# Patient Record
Sex: FEMALE | Race: WHITE | Employment: OTHER | URBAN - METROPOLITAN AREA
[De-identification: names, ages, dates, MRNs, and addresses within clinical notes are randomized per-mention and may not be internally consistent; named-entity substitution may affect disease eponyms.]

---

## 2017-06-22 ENCOUNTER — OFFICE VISIT (OUTPATIENT)
Dept: INTERNAL MEDICINE CLINIC | Age: 67
End: 2017-06-22

## 2017-06-22 VITALS
HEART RATE: 78 BPM | RESPIRATION RATE: 16 BRPM | WEIGHT: 165 LBS | BODY MASS INDEX: 29.23 KG/M2 | SYSTOLIC BLOOD PRESSURE: 124 MMHG | DIASTOLIC BLOOD PRESSURE: 82 MMHG | HEIGHT: 63 IN

## 2017-06-22 DIAGNOSIS — Z00.00 MEDICARE ANNUAL WELLNESS VISIT, SUBSEQUENT: Primary | ICD-10-CM

## 2017-06-22 DIAGNOSIS — E78.1 HYPERTRIGLYCERIDEMIA: ICD-10-CM

## 2017-06-22 DIAGNOSIS — B18.1 CHRONIC VIRAL HEPATITIS B WITHOUT DELTA AGENT AND WITHOUT COMA (HCC): ICD-10-CM

## 2017-06-22 DIAGNOSIS — B19.10 HEPATITIS B INFECTION WITHOUT DELTA AGENT WITHOUT HEPATIC COMA, UNSPECIFIED CHRONICITY: ICD-10-CM

## 2017-06-22 DIAGNOSIS — E78.2 MIXED HYPERLIPIDEMIA: ICD-10-CM

## 2017-06-22 PROCEDURE — 3017F COLORECTAL CA SCREEN DOC REV: CPT | Performed by: INTERNAL MEDICINE

## 2017-06-22 PROCEDURE — 1090F PRES/ABSN URINE INCON ASSESS: CPT | Performed by: INTERNAL MEDICINE

## 2017-06-22 PROCEDURE — G8419 CALC BMI OUT NRM PARAM NOF/U: HCPCS | Performed by: INTERNAL MEDICINE

## 2017-06-22 PROCEDURE — G0439 PPPS, SUBSEQ VISIT: HCPCS | Performed by: INTERNAL MEDICINE

## 2017-06-22 PROCEDURE — 4040F PNEUMOC VAC/ADMIN/RCVD: CPT | Performed by: INTERNAL MEDICINE

## 2017-06-22 PROCEDURE — 3014F SCREEN MAMMO DOC REV: CPT | Performed by: INTERNAL MEDICINE

## 2017-06-22 PROCEDURE — 99203 OFFICE O/P NEW LOW 30 MIN: CPT | Performed by: INTERNAL MEDICINE

## 2017-06-22 PROCEDURE — 1123F ACP DISCUSS/DSCN MKR DOCD: CPT | Performed by: INTERNAL MEDICINE

## 2017-06-22 PROCEDURE — G8399 PT W/DXA RESULTS DOCUMENT: HCPCS | Performed by: INTERNAL MEDICINE

## 2017-06-22 PROCEDURE — G8427 DOCREV CUR MEDS BY ELIG CLIN: HCPCS | Performed by: INTERNAL MEDICINE

## 2017-06-22 PROCEDURE — 4004F PT TOBACCO SCREEN RCVD TLK: CPT | Performed by: INTERNAL MEDICINE

## 2017-06-22 RX ORDER — ATORVASTATIN CALCIUM 20 MG/1
20 TABLET, FILM COATED ORAL DAILY
Qty: 30 TABLET | Refills: 0 | Status: SHIPPED | OUTPATIENT
Start: 2017-06-22 | End: 2018-06-28 | Stop reason: SDUPTHER

## 2017-06-22 ASSESSMENT — LIFESTYLE VARIABLES
HOW OFTEN DURING THE LAST YEAR HAVE YOU FOUND THAT YOU WERE NOT ABLE TO STOP DRINKING ONCE YOU HAD STARTED: 0
HAVE YOU OR SOMEONE ELSE BEEN INJURED AS A RESULT OF YOUR DRINKING: 0
HOW MANY STANDARD DRINKS CONTAINING ALCOHOL DO YOU HAVE ON A TYPICAL DAY: 0
HOW OFTEN DO YOU HAVE SIX OR MORE DRINKS ON ONE OCCASION: 0
HOW OFTEN DURING THE LAST YEAR HAVE YOU HAD A FEELING OF GUILT OR REMORSE AFTER DRINKING: 0
HOW OFTEN DURING THE LAST YEAR HAVE YOU FAILED TO DO WHAT WAS NORMALLY EXPECTED FROM YOU BECAUSE OF DRINKING: 0
HOW OFTEN DO YOU HAVE A DRINK CONTAINING ALCOHOL: 2
AUDIT-C TOTAL SCORE: 2
HAS A RELATIVE, FRIEND, DOCTOR, OR ANOTHER HEALTH PROFESSIONAL EXPRESSED CONCERN ABOUT YOUR DRINKING OR SUGGESTED YOU CUT DOWN: 0
HOW OFTEN DURING THE LAST YEAR HAVE YOU NEEDED AN ALCOHOLIC DRINK FIRST THING IN THE MORNING TO GET YOURSELF GOING AFTER A NIGHT OF HEAVY DRINKING: 0
AUDIT TOTAL SCORE: 2
HOW OFTEN DURING THE LAST YEAR HAVE YOU BEEN UNABLE TO REMEMBER WHAT HAPPENED THE NIGHT BEFORE BECAUSE YOU HAD BEEN DRINKING: 0

## 2017-06-22 ASSESSMENT — ANXIETY QUESTIONNAIRES: GAD7 TOTAL SCORE: 0

## 2017-06-22 ASSESSMENT — PATIENT HEALTH QUESTIONNAIRE - PHQ9: SUM OF ALL RESPONSES TO PHQ QUESTIONS 1-9: 0

## 2018-06-28 ENCOUNTER — OFFICE VISIT (OUTPATIENT)
Dept: INTERNAL MEDICINE CLINIC | Age: 68
End: 2018-06-28

## 2018-06-28 VITALS
WEIGHT: 163 LBS | OXYGEN SATURATION: 96 % | HEIGHT: 63 IN | BODY MASS INDEX: 28.88 KG/M2 | SYSTOLIC BLOOD PRESSURE: 142 MMHG | DIASTOLIC BLOOD PRESSURE: 82 MMHG | HEART RATE: 96 BPM

## 2018-06-28 DIAGNOSIS — I10 ESSENTIAL HYPERTENSION: ICD-10-CM

## 2018-06-28 DIAGNOSIS — Z00.00 ROUTINE GENERAL MEDICAL EXAMINATION AT A HEALTH CARE FACILITY: ICD-10-CM

## 2018-06-28 DIAGNOSIS — Z87.891 PERSONAL HISTORY OF TOBACCO USE, PRESENTING HAZARDS TO HEALTH: ICD-10-CM

## 2018-06-28 DIAGNOSIS — Z12.31 ENCOUNTER FOR SCREENING MAMMOGRAM FOR BREAST CANCER: ICD-10-CM

## 2018-06-28 DIAGNOSIS — Z00.00 MEDICARE ANNUAL WELLNESS VISIT, SUBSEQUENT: Primary | ICD-10-CM

## 2018-06-28 DIAGNOSIS — E78.2 MIXED HYPERLIPIDEMIA: ICD-10-CM

## 2018-06-28 PROCEDURE — G8399 PT W/DXA RESULTS DOCUMENT: HCPCS | Performed by: INTERNAL MEDICINE

## 2018-06-28 PROCEDURE — G8427 DOCREV CUR MEDS BY ELIG CLIN: HCPCS | Performed by: INTERNAL MEDICINE

## 2018-06-28 PROCEDURE — 1123F ACP DISCUSS/DSCN MKR DOCD: CPT | Performed by: INTERNAL MEDICINE

## 2018-06-28 PROCEDURE — 99213 OFFICE O/P EST LOW 20 MIN: CPT | Performed by: INTERNAL MEDICINE

## 2018-06-28 PROCEDURE — 3017F COLORECTAL CA SCREEN DOC REV: CPT | Performed by: INTERNAL MEDICINE

## 2018-06-28 PROCEDURE — 99406 BEHAV CHNG SMOKING 3-10 MIN: CPT | Performed by: INTERNAL MEDICINE

## 2018-06-28 PROCEDURE — 1090F PRES/ABSN URINE INCON ASSESS: CPT | Performed by: INTERNAL MEDICINE

## 2018-06-28 PROCEDURE — 4040F PNEUMOC VAC/ADMIN/RCVD: CPT | Performed by: INTERNAL MEDICINE

## 2018-06-28 PROCEDURE — G0439 PPPS, SUBSEQ VISIT: HCPCS | Performed by: INTERNAL MEDICINE

## 2018-06-28 PROCEDURE — G8419 CALC BMI OUT NRM PARAM NOF/U: HCPCS | Performed by: INTERNAL MEDICINE

## 2018-06-28 PROCEDURE — 4004F PT TOBACCO SCREEN RCVD TLK: CPT | Performed by: INTERNAL MEDICINE

## 2018-06-28 RX ORDER — ATORVASTATIN CALCIUM 20 MG/1
20 TABLET, FILM COATED ORAL DAILY
Qty: 30 TABLET | Refills: 0 | Status: SHIPPED | OUTPATIENT
Start: 2018-06-28 | End: 2018-07-27 | Stop reason: SDUPTHER

## 2018-06-28 RX ORDER — LISINOPRIL 10 MG/1
10 TABLET ORAL DAILY
Qty: 30 TABLET | Refills: 0 | Status: SHIPPED | OUTPATIENT
Start: 2018-06-28 | End: 2018-07-26 | Stop reason: SDUPTHER

## 2018-06-28 ASSESSMENT — LIFESTYLE VARIABLES
HAVE YOU OR SOMEONE ELSE BEEN INJURED AS A RESULT OF YOUR DRINKING: 0
HOW OFTEN DURING THE LAST YEAR HAVE YOU FAILED TO DO WHAT WAS NORMALLY EXPECTED FROM YOU BECAUSE OF DRINKING: 0
HOW OFTEN DURING THE LAST YEAR HAVE YOU NEEDED AN ALCOHOLIC DRINK FIRST THING IN THE MORNING TO GET YOURSELF GOING AFTER A NIGHT OF HEAVY DRINKING: 0
HAS A RELATIVE, FRIEND, DOCTOR, OR ANOTHER HEALTH PROFESSIONAL EXPRESSED CONCERN ABOUT YOUR DRINKING OR SUGGESTED YOU CUT DOWN: 0
HOW OFTEN DO YOU HAVE SIX OR MORE DRINKS ON ONE OCCASION: 0
AUDIT-C TOTAL SCORE: 3
HOW OFTEN DURING THE LAST YEAR HAVE YOU BEEN UNABLE TO REMEMBER WHAT HAPPENED THE NIGHT BEFORE BECAUSE YOU HAD BEEN DRINKING: 0
HOW OFTEN DURING THE LAST YEAR HAVE YOU FOUND THAT YOU WERE NOT ABLE TO STOP DRINKING ONCE YOU HAD STARTED: 0
AUDIT TOTAL SCORE: 3
HOW MANY STANDARD DRINKS CONTAINING ALCOHOL DO YOU HAVE ON A TYPICAL DAY: 1
HOW OFTEN DURING THE LAST YEAR HAVE YOU HAD A FEELING OF GUILT OR REMORSE AFTER DRINKING: 0
HOW OFTEN DO YOU HAVE A DRINK CONTAINING ALCOHOL: 2

## 2018-06-28 ASSESSMENT — ANXIETY QUESTIONNAIRES: GAD7 TOTAL SCORE: 0

## 2018-06-28 ASSESSMENT — PATIENT HEALTH QUESTIONNAIRE - PHQ9: SUM OF ALL RESPONSES TO PHQ QUESTIONS 1-9: 0

## 2018-07-26 ENCOUNTER — OFFICE VISIT (OUTPATIENT)
Dept: INTERNAL MEDICINE CLINIC | Age: 68
End: 2018-07-26

## 2018-07-26 ENCOUNTER — HOSPITAL ENCOUNTER (OUTPATIENT)
Dept: WOMENS IMAGING | Age: 68
Discharge: HOME | End: 2018-07-26
Payer: MEDICARE

## 2018-07-26 VITALS
HEART RATE: 105 BPM | BODY MASS INDEX: 29.06 KG/M2 | HEIGHT: 63 IN | DIASTOLIC BLOOD PRESSURE: 76 MMHG | SYSTOLIC BLOOD PRESSURE: 130 MMHG | WEIGHT: 164 LBS | OXYGEN SATURATION: 95 %

## 2018-07-26 DIAGNOSIS — I10 ESSENTIAL HYPERTENSION: ICD-10-CM

## 2018-07-26 DIAGNOSIS — Z12.31 ENCOUNTER FOR SCREENING MAMMOGRAM FOR BREAST CANCER: ICD-10-CM

## 2018-07-26 DIAGNOSIS — E78.2 MIXED HYPERLIPIDEMIA: Primary | ICD-10-CM

## 2018-07-26 DIAGNOSIS — B18.1 CHRONIC VIRAL HEPATITIS B WITHOUT DELTA AGENT AND WITHOUT COMA (HCC): ICD-10-CM

## 2018-07-26 LAB
A/G RATIO: 1.5 (ref 1.1–2.2)
ALBUMIN SERPL-MCNC: 4.7 G/DL (ref 3.4–5)
ALP BLD-CCNC: 93 U/L (ref 40–129)
ALT SERPL-CCNC: 28 U/L (ref 10–40)
ANION GAP SERPL CALCULATED.3IONS-SCNC: 16 MMOL/L (ref 3–16)
AST SERPL-CCNC: 25 U/L (ref 15–37)
BASOPHILS ABSOLUTE: 0 K/UL (ref 0–0.2)
BASOPHILS RELATIVE PERCENT: 0.4 %
BILIRUB SERPL-MCNC: 0.3 MG/DL (ref 0–1)
BUN BLDV-MCNC: 17 MG/DL (ref 7–20)
CALCIUM SERPL-MCNC: 10.5 MG/DL (ref 8.3–10.6)
CHLORIDE BLD-SCNC: 102 MMOL/L (ref 99–110)
CHOLESTEROL, TOTAL: 172 MG/DL (ref 0–199)
CO2: 28 MMOL/L (ref 21–32)
CREAT SERPL-MCNC: 0.8 MG/DL (ref 0.6–1.2)
EOSINOPHILS ABSOLUTE: 0.1 K/UL (ref 0–0.6)
EOSINOPHILS RELATIVE PERCENT: 1.4 %
GFR AFRICAN AMERICAN: >60
GFR NON-AFRICAN AMERICAN: >60
GLOBULIN: 3.1 G/DL
GLUCOSE BLD-MCNC: 96 MG/DL (ref 70–99)
HCT VFR BLD CALC: 45.1 % (ref 36–48)
HDLC SERPL-MCNC: 48 MG/DL (ref 40–60)
HEMOGLOBIN: 15 G/DL (ref 12–16)
LDL CHOLESTEROL CALCULATED: 87 MG/DL
LYMPHOCYTES ABSOLUTE: 2.5 K/UL (ref 1–5.1)
LYMPHOCYTES RELATIVE PERCENT: 24.7 %
MCH RBC QN AUTO: 29.3 PG (ref 26–34)
MCHC RBC AUTO-ENTMCNC: 33.2 G/DL (ref 31–36)
MCV RBC AUTO: 88.2 FL (ref 80–100)
MONOCYTES ABSOLUTE: 0.6 K/UL (ref 0–1.3)
MONOCYTES RELATIVE PERCENT: 6.3 %
NEUTROPHILS ABSOLUTE: 6.7 K/UL (ref 1.7–7.7)
NEUTROPHILS RELATIVE PERCENT: 67.2 %
PDW BLD-RTO: 13.5 % (ref 12.4–15.4)
PLATELET # BLD: 305 K/UL (ref 135–450)
PMV BLD AUTO: 9.1 FL (ref 5–10.5)
POTASSIUM SERPL-SCNC: 4.8 MMOL/L (ref 3.5–5.1)
RBC # BLD: 5.12 M/UL (ref 4–5.2)
SODIUM BLD-SCNC: 146 MMOL/L (ref 136–145)
TOTAL PROTEIN: 7.8 G/DL (ref 6.4–8.2)
TRIGL SERPL-MCNC: 183 MG/DL (ref 0–150)
VLDLC SERPL CALC-MCNC: 37 MG/DL
WBC # BLD: 10 K/UL (ref 4–11)

## 2018-07-26 PROCEDURE — 36415 COLL VENOUS BLD VENIPUNCTURE: CPT | Performed by: INTERNAL MEDICINE

## 2018-07-26 PROCEDURE — 1090F PRES/ABSN URINE INCON ASSESS: CPT | Performed by: INTERNAL MEDICINE

## 2018-07-26 PROCEDURE — 4004F PT TOBACCO SCREEN RCVD TLK: CPT | Performed by: INTERNAL MEDICINE

## 2018-07-26 PROCEDURE — 1123F ACP DISCUSS/DSCN MKR DOCD: CPT | Performed by: INTERNAL MEDICINE

## 2018-07-26 PROCEDURE — 99214 OFFICE O/P EST MOD 30 MIN: CPT | Performed by: INTERNAL MEDICINE

## 2018-07-26 PROCEDURE — 3017F COLORECTAL CA SCREEN DOC REV: CPT | Performed by: INTERNAL MEDICINE

## 2018-07-26 PROCEDURE — G8419 CALC BMI OUT NRM PARAM NOF/U: HCPCS | Performed by: INTERNAL MEDICINE

## 2018-07-26 PROCEDURE — 4040F PNEUMOC VAC/ADMIN/RCVD: CPT | Performed by: INTERNAL MEDICINE

## 2018-07-26 PROCEDURE — 77067 SCR MAMMO BI INCL CAD: CPT

## 2018-07-26 PROCEDURE — G8399 PT W/DXA RESULTS DOCUMENT: HCPCS | Performed by: INTERNAL MEDICINE

## 2018-07-26 PROCEDURE — G8427 DOCREV CUR MEDS BY ELIG CLIN: HCPCS | Performed by: INTERNAL MEDICINE

## 2018-07-26 PROCEDURE — 1101F PT FALLS ASSESS-DOCD LE1/YR: CPT | Performed by: INTERNAL MEDICINE

## 2018-07-26 RX ORDER — LISINOPRIL 10 MG/1
10 TABLET ORAL DAILY
Qty: 90 TABLET | Refills: 1 | Status: SHIPPED | OUTPATIENT
Start: 2018-07-26 | End: 2018-12-20 | Stop reason: SDUPTHER

## 2018-07-26 NOTE — PROGRESS NOTES
Comprehensive Metabolic Panel  -     CBC Auto Differential  -     lisinopril (PRINIVIL;ZESTRIL) 10 MG tablet; Take 1 tablet by mouth daily    Chronic viral hepatitis B without delta agent and without coma (HCC)  -     Hepatitis B Surface Antibody  -     HEPATITIS B CORE ANTIBODY, TOTAL        Return 5 months BP and ? cholesterol.

## 2018-07-27 DIAGNOSIS — E78.2 MIXED HYPERLIPIDEMIA: ICD-10-CM

## 2018-07-27 LAB
HBV SURFACE AB TITR SER: <3.5 MIU/ML
HEPATITIS B CORE TOTAL ANTIBODY: POSITIVE

## 2018-07-27 RX ORDER — ATORVASTATIN CALCIUM 20 MG/1
20 TABLET, FILM COATED ORAL DAILY
Qty: 90 TABLET | Refills: 3 | Status: SHIPPED | OUTPATIENT
Start: 2018-07-27 | End: 2019-07-02 | Stop reason: SDUPTHER

## 2018-07-30 DIAGNOSIS — B18.1 CHRONIC VIRAL HEPATITIS B WITHOUT DELTA AGENT AND WITHOUT COMA (HCC): Primary | ICD-10-CM

## 2018-08-02 ENCOUNTER — HOSPITAL ENCOUNTER (OUTPATIENT)
Age: 68
Discharge: HOME | End: 2018-08-02
Payer: MEDICARE

## 2018-08-02 ENCOUNTER — INITIAL CONSULT (OUTPATIENT)
Dept: GASTROENTEROLOGY | Age: 68
End: 2018-08-02

## 2018-08-02 VITALS
WEIGHT: 165.2 LBS | DIASTOLIC BLOOD PRESSURE: 90 MMHG | HEIGHT: 63 IN | BODY MASS INDEX: 29.27 KG/M2 | SYSTOLIC BLOOD PRESSURE: 150 MMHG

## 2018-08-02 DIAGNOSIS — Z86.010 HISTORY OF COLON POLYPS: ICD-10-CM

## 2018-08-02 DIAGNOSIS — R76.8 HEPATITIS B CORE ANTIBODY POSITIVE: ICD-10-CM

## 2018-08-02 DIAGNOSIS — R76.8 HEPATITIS B CORE ANTIBODY POSITIVE: Primary | ICD-10-CM

## 2018-08-02 PROCEDURE — 1101F PT FALLS ASSESS-DOCD LE1/YR: CPT | Performed by: INTERNAL MEDICINE

## 2018-08-02 PROCEDURE — 4040F PNEUMOC VAC/ADMIN/RCVD: CPT | Performed by: INTERNAL MEDICINE

## 2018-08-02 PROCEDURE — 1123F ACP DISCUSS/DSCN MKR DOCD: CPT | Performed by: INTERNAL MEDICINE

## 2018-08-02 PROCEDURE — G8419 CALC BMI OUT NRM PARAM NOF/U: HCPCS | Performed by: INTERNAL MEDICINE

## 2018-08-02 PROCEDURE — 87517 HEPATITIS B DNA QUANT: CPT

## 2018-08-02 PROCEDURE — 4004F PT TOBACCO SCREEN RCVD TLK: CPT | Performed by: INTERNAL MEDICINE

## 2018-08-02 PROCEDURE — G8399 PT W/DXA RESULTS DOCUMENT: HCPCS | Performed by: INTERNAL MEDICINE

## 2018-08-02 PROCEDURE — G8427 DOCREV CUR MEDS BY ELIG CLIN: HCPCS | Performed by: INTERNAL MEDICINE

## 2018-08-02 PROCEDURE — 3017F COLORECTAL CA SCREEN DOC REV: CPT | Performed by: INTERNAL MEDICINE

## 2018-08-02 PROCEDURE — 99204 OFFICE O/P NEW MOD 45 MIN: CPT | Performed by: INTERNAL MEDICINE

## 2018-08-02 PROCEDURE — 1090F PRES/ABSN URINE INCON ASSESS: CPT | Performed by: INTERNAL MEDICINE

## 2018-08-02 RX ORDER — SODIUM, POTASSIUM,MAG SULFATES 17.5-3.13G
1 SOLUTION, RECONSTITUTED, ORAL ORAL ONCE
Qty: 1 BOTTLE | Refills: 0 | Status: SHIPPED | OUTPATIENT
Start: 2018-08-02 | End: 2018-08-02

## 2018-08-02 NOTE — PATIENT INSTRUCTIONS
INTRODUCTION - The term \"hepatitis\" is used to describe a common form of liver injury. Hepatitis simply means \"inflammation of the liver\" (the suffix \"itis\" means inflammation and \"hepa\" means liver). Hepatitis B is a specific type of hepatitis that is caused by a virus. It is estimated that there are more than 300 million carriers of the hepatitis B virus in the world, with over 500,000 dying annually from hepatitis B-related liver disease. Fortunately, treatments for chronic hepatitis B are available, and many new treatments are in development. Vaccines can prevent hepatitis B infection, and are now given routinely to newborns and children in the [place] and in many other countries. (See \"Patient information: Adult vaccines\".)    HOW DID I BECOME INFECTED WITH HEPATITIS B? - There are several ways to become infected with hepatitis B virus. Contaminated needles - Using contaminated needles can spread the hepatitis B virus. This includes tattooing, acupuncture, and ear piercing (if these procedures are performed with contaminated instruments). Sharing needles or syringes can also spread the virus. Sex - Sexual contact with someone who is infected is one of the most common ways to become infected with hepatitis B. If you are infected with hepatitis B, make sure your spouse or sex partner gets vaccinated. Mother to infant - Hepatitis B can be passed from a mother to her baby during or shortly after delivery. Having a  delivery (also called a ) does not prevent the virus from spreading. Experts believe that breastfeeding is safe. During pregnancy, all women should have a blood test for a marker of hepatitis B virus, called hepatitis B surface antigen (HBsAg). Normally, the HBsAg should be negative. If the mother's HBsAg test is positive, the infant should be given a shot soon after birth (called hepatitis B immunoglobulin or HBIG).  HBIG temporarily helps to protect the infant from infection. The infant should also get the hepatitis B vaccine at birth, at 1 to 2 months, and at 6 months. The infant should have a blood test for hepatitis B infection at 5to 25months of age; if the test is negative, a fourth dose of the vaccine should be given at that time. In some cases, the mother is also given a medication that reduces the amount of virus in her blood for several weeks before giving birth. Close contact - Hepatitis B can be spread through close personal contact. This could happen if blood or other bodily fluids get into tiny cracks or breaks in your skin or in your mouth or eyes. The virus can live for a long time away from the body, meaning that it can be spread by sharing household items like toys, toothbrushes, or razors. Blood transfusion and organ transplantation - Blood transfusion and organ transplantation are now uncommon ways for hepatitis B to spread. Blood and organ donors are carefully screened for markers of hepatitis infection. (See \"Patient information: Blood donation and transfusion\". )    In the hospital - In the hospital, hepatitis B virus can spread from one patient to another or from a patient to a doctor or nurse if there is an accidental needle stick. It is rare for a doctor/nurse to pass hepatitis B to a patient. Wearing gloves, eye protection, a face mask, and washing hands can help to prevent spreading the virus. HEPATITIS B SYMPTOMS - In most cases, hepatitis B causes no symptoms for many years. Not having symptoms does not necessarily mean that the infection is under control. Everyone with chronic hepatitis B is at increased risk of developing complications, including liver scarring (called cirrhosis when the scarring is severe) and liver cancer. (See \"Patient information: Cirrhosis\". )    Acute hepatitis B - After a person is first infected with hepatitis B, they are said to have acute hepatitis.  Most people with acute hepatitis B recover uneventfully. However, in about 5 percent of adults (1 in 20) the virus makes itself at home in the liver, where it continues to make copies of itself for many years. People who continue to harbor the virus are referred to as \"carriers\". If liver damage develops because of longstanding infection, the person is said to have chronic hepatitis. Chronic hepatitis B - Chronic hepatitis B develops more commonly in people who are infected with the virus at an early age (often at birth). Unfortunately, this is common in some parts of the world such as in [facility], [place], and [facility], where as many as 1 in 10 people have chronic hepatitis B infection. Many people with chronic hepatitis B have no symptoms at all; other people have symptoms of ongoing liver inflammation, such as fatigue and loss of appetite. HEPATITIS B DIAGNOSIS - The diagnosis of hepatitis B is based upon the results of blood tests. (See \"Serologic diagnosis of hepatitis B virus infection\". )        Liver biopsy - A liver biopsy is not routinely needed to diagnose hepatitis B. A liver biopsy is used to monitor liver damage in people with chronic hepatitis, help decide if treatment is needed, and find signs of cirrhosis or liver cancer. More information about liver biopsy is available separately. (See \"Patient information: Liver biopsy\".)    WILL I DEVELOP CHRONIC HEPATITIS B? - The likelihood of developing chronic hepatitis B largely depends on your age at the time of infection. Chronic infection develops in about 80 percent of children who are infected at birth, in 21 to 48 percent of children who are infected between the ages of 3 and 5 years, and in less than 5 percent of people infected with hepatitis B during adulthood. The risk of developing complications of chronic hepatitis B depends on how rapidly the virus multiplies and how well your immune system controls the infection.  Drinking alcohol and having chronic hepatitis lamivudine-resistant HBV. (See \"Adefovir dipivoxil in the treatment of chronic hepatitis B virus infection\". )    Adefovir is taken by mouth, at a dosage of 10 mg/day, for at least one year. Most patients will need long-term treatment to maintain control of the hepatitis B virus. Adefovir is a weak antiviral medicine, and resistance does occur over time. Other medicines are available that are more potent. Entecavir - Entecavir (Baraclude®) is generally more potent than lamivudine and adefovir. Resistance to entecavir is uncommon in people who have never been treated with antivirals, but occurs in up to 50 percent of people who have used lamivudine. (See \"Entecavir in the treatment of chronic hepatitis B virus infection\". )    Entecavir is taken by mouth, at a dosage of 0.5 mg daily for patients who have no prior treatment and 1.0 mg daily for patients who have resistance to lamivudine. Most patients will need long-term treatment to maintain control of the hepatitis B virus. Tenofovir - Tenofovir (Viread®) is more potent than adefovir. Resistance to tenofovir is rare. Tenofovir is taken by mouth, at a dosage of 300 mg daily. Tenofovir is effective in suppressing hepatitis B virus that is resistant to lamivudine, telbivudine, or entecavir. Tenofovir is not as effective in patients with adefovir-resistant hepatitis B. Resistance to tenofovir is uncommon. (See \"Tenofovir disoproxil fumarate in the treatment of adults with chronic HBV infection who do not have HIV infection\". )    Telbivudine - Telbivudine ([place]) is more potent than lamivudine and adefovir. Resistance to telbivudine is common, and hepatitis B virus that is resistant to lamivudine is also resistant to telbivudine. Telbivudine is taken by mouth at a dosage of 600 mg daily. Other medicines are available that are less likely to cause resistance. (See \"Telbivudine in the treatment of chronic hepatitis B virus infection\". )    Interferon-alpha - hepatitis A unless they are known to be immune. Influenza vaccination is recommended once per year, usually in the fall. Patients with liver disease should also receive standard immunizations, including a diphtheria and tetanus booster, every ten years. (See \"Patient information: Adult vaccines\".)    Liver cancer screening - Regular screening for liver cancer is also recommended, particularly for older individuals, those with cirrhosis, and patients with a family history of liver cancer. In general, this includes an ultrasound examination of the liver every six months. Diet - No specific diet has been shown to improve the outcome in people with hepatitis B. The best advice is to eat a normal healthy and balanced diet. Alcohol - Alcohol should be avoided since it can worsen liver damage. All types of alcoholic beverages can be harmful to the liver. People with hepatitis B can develop liver complications even with small amounts of alcohol. Exercise - Exercise is good for overall health and is encouraged, but it has no effect on the hepatitis B virus. Prescription and nonprescription drugs - Many medications are broken down by the liver. Thus, it is always best to check with a healthcare provider or pharmacist before starting a new medication. As a general rule, unless the liver is already scarred, most drugs are safe for people with hepatitis B. An important possible exception is acetaminophen (Tylenol®); the maximum recommended dose in people with liver disease is no more than 2 grams (2000 mg) in 24 hours. Most acetaminophen tabs or capsules contain 325 or 500 mg. You should avoid ibuprofen (sold as Advil, Motrin, and store brands), naproxen (sold as Aleve and store brands), and aspirin (sold as Bufferin, Excedrin, and store brands). Herbal medications - No herbal treatment has been proven to improve outcomes in patients with hepatitis B, and some can cause serious liver toxicity.  Herbal or vitamin treatments are not recommended for anyone with hepatitis B. Support - Sharing concerns with others infected with hepatitis B can provide support. A number of organizations are available around the world. (See 'Where to get more information' below. )    PREVENT INFECTION OF FAMILY - Acute and chronic hepatitis B are contagious. Thus, people with hepatitis B should discuss measures to reduce the risk of infecting close contacts. This includes the following:        Discuss the infection with any sexual partners and use a latex condom with every sexual encounter. Do not share razors, toothbrushes, or anything that has blood on it. Cover open sores and cuts with a bandage. Do not donate blood, body organs, other tissues, or sperm. Immediate family and household members should be tested for hepatitis B. Anyone who is at risk of hepatitis B infection should be vaccinated. Do not share any injection drug equipment (needles, cotton swabs, syringes). Clean blood spills with a mixture of 1 part household bleach to 9 parts water. Hepatitis B cannot be spread by:        Hugging or kissing      Sharing eating utensils or cups      Sneezing or coughing      Breastfeeding    WHERE TO GET MORE INFORMATION - Your healthcare provider is the best source of information for questions and concerns related to your medical problem. This article will be updated as needed every four months on our web site ([email or web address]/patients). The following organizations also provide reliable health information. [employer]          (www.nlm.nih.gov/medlineplus/healthtopics. html)        Centers for Disease Control          (www.cdc.gov/ncidod/diseases/hepatitis/index. htm)        [employer] of [employer] and Digestive and Kidney Diseases          (www.niddk.nih.gov)        [employer] of Allergy and Infectious Diseases          (www.niaid.nih.gov/)        National

## 2018-08-02 NOTE — LETTER
[address]  [place], [facility]  Hannah Leal [phone]  F [phone]    08/02/18  Patient: Marcelle Luevano   MR Number: [id number]   YOB: 1950   Date of Visit: 8/2/18     Dear Dr. Presley Tipton MD    Thank you for the request for consultation for Marcelle Luevano to me for the evaluation of   Chief Complaint   Patient presents with    Hepatitis     B. Diagnosed in 2012 and was never treated. She tried to donate blood at that time and they would not allow her. . Below are the relevant portions of my assessment and plan of care. FINAL DIAGNOSIS/Assessment   Diagnosis Orders   1. Hepatitis B core antibody positive  Hepatitis B DNA RT-PCR, Quantitative   2. History of colon polyps  COLONOSCOPY W/ OR W/O BIOPSY    Na Sulfate-K Sulfate-Mg Sulf 17.5-3.13-1.6 GM/180ML SOLN    bisacodyl (DULCOLAX) 5 MG EC tablet       VISIT ORDERS/Plan  Orders Placed This Encounter   Procedures    COLONOSCOPY W/ OR W/O BIOPSY     Standing Status:   Future     Standing Expiration Date:   8/2/2019     Scheduling Instructions:      Please provide prep of choice instructions and prescription. If on anticoagulant see office note for miya procedural anticoagulant management. Order Specific Question:   Screening or Diagnostic? Answer:   Diagnostic    Hepatitis B DNA RT-PCR, Quantitative     Standing Status:   Future     Standing Expiration Date:   9/2/2018       If you have questions, please do not hesitate to call me. I look forward to following Marcelle Luevano along with you.     Sincerely,        Kellie [place] 21 8/2/18 1:04 PM

## 2018-08-02 NOTE — PROGRESS NOTES
[address] ,  [address] [facility]  Phone: [phone] [address]     Chief Complaint   Patient presents with    Hepatitis     B. Diagnosed in 2012 and was never treated. She tried to donate blood at that time and they would not allow her. HPI     Thank you NI BASSETT MD for asking me to see Marcelle Luevano in consultation. She is a  [5] White [1] 76 y.o. Chantell Copper female seen independently who presents with the following GI complaints:  .  Marcelle Luevano  Is here for second opinion for possible chronic hepatitis B.  HBsAb and HBcAb were positive in 2012. LFTs and CBC are normal.  She is concerned about her  who has passed had it. No family history of liver disease. No current symptoms. Denies etoh use. She is due for colonoscopy for a h/o colon polyp as below. Has 4 grown children. Had a daughter who drowned at a young age. HPI elements: location, severity, timing, modifying factors, quality, duration, context and associated signs/symptoms. Last Encounter Reviewed:   Pertinent PMH, FH, SH is reviewed below. Last EGD: none  Last Colonoscopy: 6/24/2015 5mm sessile serrated polyp    Review of available records reveals:   Wt Readings from Last 50 Encounters:   08/02/18 165 lb 3.2 oz (74.9 kg)   07/26/18 164 lb (74.4 kg)   06/28/18 163 lb (73.9 kg)   06/22/17 165 lb (74.8 kg)   05/12/16 168 lb (76.2 kg)   05/06/16 168 lb (76.2 kg)   06/24/15 163 lb (73.9 kg)   06/03/15 163 lb 6.4 oz (74.1 kg)   04/30/15 166 lb 12.8 oz (75.7 kg)   01/08/13 160 lb (72.6 kg)   01/01/13 160 lb (72.6 kg)       No components found for: HGBA1C  BP Readings from Last 3 Encounters:   08/02/18 (!) 160/110   07/26/18 130/76   06/28/18 (!) 142/82     Health Maintenance   Topic Date Due    Shingles Vaccine (1 of 2 - 2 Dose Series) 02/07/2000    Flu vaccine (1) 09/01/2018    Breast cancer screen  07/26/2019    Potassium monitoring  07/26/2019    Creatinine monitoring  2019    Lipid screen  2023    DTaP/Tdap/Td vaccine (2 - Td) 2025    Colon cancer screen colonoscopy  2025    DEXA (modify frequency per FRAX score)  Completed    Pneumococcal low/med risk  Completed    Hepatitis C screen  Completed       No components found for: [facility]     PAST MEDICAL HISTORY     Past Medical History:   Diagnosis Date    Abnormal hepatitis serology     Reactive Hepatitis B Surface Ag, Hepatitis B Core total (13)    Chronic viral hepatitis B without delta agent and without coma ([facility] [address] 2017    Elevated rheumatoid factor     HTN (hypertension) 5/10/16    Hyperglycemia     Hyperlipidemia     (): Hypertriglyceridemia, Low HDL    Overweight(278.02)     max weight 172 lbs (), goal = 143 lbs    Postmenopausal bone loss 5/15: Dexa    age 48: postmenopausal    Smoker age 12    Vitamin D insufficiency      FAMILY HISTORY     Family History   Problem Relation Age of Onset    High Blood Pressure Sister     Stroke Sister 48    High Blood Pressure Brother     Breast Cancer Mother 59         of Breast Cancer-Metastases to Bone    Cancer Mother         breast    High Cholesterol Sister     Heart Attack Paternal Grandmother 76         of MI age 76    Diabetes Paternal Cousin 19         age 27    Breast Cancer Maternal Aunt 61         of Breast Cancer    Breast Cancer Maternal Grandmother 80         age 80 of Breast Cancer     SOCIAL HISTORY     Social History     Social History    Marital status:      Spouse name: Yonatan Blevins Number of children: 4    Years of education: 11th grade     Occupational History    1973: Former [employer] Work       Social History Main Topics    Smoking status: Current Some Day Smoker     Packs/day: 0.25     Years: 49.00     Types: Cigarettes    Smokeless tobacco: Never Used      Comment: smoking 1-2 cigs/day.  Quit Date (17)    Alcohol use 0.6 oz/week     1 Cans of beer per week      Comment: occasional    Drug use: No    Sexual activity: Not Currently     Partners: Male     Birth control/ protection: Post-menopausal     Other Topics Concern    Not on file     Social History Narrative    No narrative on file     SURGICAL HISTORY     Past Surgical History:   Procedure Laterality Date    COLONOSCOPY  6/24/2015    polyps    DENTAL SURGERY  ages [age]    upper teeth removed, 2 back lower teeth removed    TUBAL LIGATION Bilateral 1978     CURRENT MEDICATIONS   (This list may include medications prescribed during this encounter as epic can not insert only the list prior to this encounter.)  Current Outpatient Rx   Medication Sig Dispense Refill    Na Sulfate-K Sulfate-Mg Sulf 17.5-3.13-1.6 GM/180ML SOLN Take 1 kit by mouth once for 1 dose Insured 30% off (max $15) after first $40 of co-pay/cash price [email or web address] 1 Bottle 0    bisacodyl (DULCOLAX) 5 MG EC tablet Take 1 tablet by mouth daily as needed for Constipation 4 tablet 0    atorvastatin (LIPITOR) 20 MG tablet Take 1 tablet by mouth daily 90 tablet 3    lisinopril (PRINIVIL;ZESTRIL) 10 MG tablet Take 1 tablet by mouth daily 90 tablet 1    Cholecalciferol (VITAMIN D-3 PO) Take 1,000 Units by mouth daily       ALLERGIES   No Known Allergies  IMMUNIZATIONS     Immunization History   Administered Date(s) Administered    Influenza Vaccine, unspecified formulation 11/24/2012, 10/08/2013, 11/08/2014, 10/05/2015    Influenza Virus Vaccine 11/24/2012, 10/08/2013, 11/08/2014, 10/05/2015    Pneumococcal 13-valent Conjugate ([id number]) 04/30/2015    Pneumococcal Polysaccharide ([id number]) 05/06/2016    Tdap (Boostrix, Adacel) 04/30/2015     REVIEW OF SYSTEMS   See HPI for further details and pertinent postiives. Negative for the following:  Constitutional: Negative for weight change. Negative for appetite change and fatigue.    HENT: Negative for nosebleeds, sore throat, mouth sores, and voice change. Respiratory: Negative for cough, choking and chest tightness. Cardiovascular: Negative for chest pain   Gastrointestinal: See HPI  Musculoskeletal: Negative for arthralgias. Skin: Negative for pallor. Neurological: Negative for weakness and light-headedness. Hematological: Negative for adenopathy. Does not bruise/bleed easily. Psychiatric/Behavioral: Negative for suicidal ideas. PHYSICAL EXAM   VITAL SIGNS: BP (!) 160/110   Ht 5' 2.99\" (1.6 m)   Wt 165 lb 3.2 oz (74.9 kg)   LMP 01/01/2000   BMI 29.27 kg/m²   Wt Readings from Last 3 Encounters:   08/02/18 165 lb 3.2 oz (74.9 kg)   07/26/18 164 lb (74.4 kg)   06/28/18 163 lb (73.9 kg)     Constitutional: Well developed, Well nourished, No acute distress, Non-toxic appearance. HENT: Normocephalic, Atraumatic, Bilateral external ears normal, Oropharynx moist, No oral exudates, Nose normal.   Eyes: Conjunctiva normal, No discharge. Neck: Normal range of motion, No tenderness, Supple, No stridor. Lymphatic: No cervical, subclavian, or axillary lymphadenopathy. Cardiovascular: Normal heart rate, Normal rhythm, No murmurs, No rubs, No gallops. Thorax & Lungs: Normal breath sounds, No respiratory distress, No wheezing, No chest tenderness. No gynecomastia. Abdomen: scars consistent with stated surgeries, no hernias, no HSM, soft NTND   Rectal:  Deferred. Skin: Warm, Dry, No erythema, No rash. No bruising. No spider hemangiomas. Back: No tenderness, No CVA tenderness. Lower Extremities: Intact distal pulses, No edema, No tenderness, No cyanosis, No clubbing. Neurologic: Alert & oriented x 3, Normal motor function, Normal sensory function, No focal deficits noted. No asterixis.   RADIOLOGY/PROCEDURES         FINAL IMPRESSION     Orders Placed This Encounter   Procedures    COLONOSCOPY W/ OR W/O BIOPSY     Standing Status:   Future     Standing Expiration Date:   8/2/2019     Scheduling Instructions:      Please

## 2018-08-05 LAB
HBV DNA QNT I/U: 840 IU/ML
HEPATITIS B DNA, PCR (QUANT): DETECTED
LOG 10 HBV AS IU/ML: 2.9 LOG IU

## 2018-08-08 DIAGNOSIS — B18.1 CHRONIC HEPATITIS B (HCC): Primary | ICD-10-CM

## 2018-08-08 NOTE — PROGRESS NOTES
Please call patient with results showing a low viral load indicating they are a hepatitis B carrier. Recommend lft's, hepB dna, and RUQ us yearly as carriers (sAb positive but low DNA levels, normal LFT's. While most remain carriers, it may reactivate and if this happens carry a higher risk for liver cancer. Should also be vaccinated against hepatitis A (if not done) and avoid sushi or raw seafood consumption. If HBV DNA is greater then 1000 or lft's elevated, this suggest chronic active hbv and antiviral treatment to suppress viral load would be indicated. Recommend routine ultrasound and AFP in patients with chronic hepatitis B to screen for hepatoma with or without cirrhosis after 40 as CHBV is the number one cause of liver cancer world wide. I ordered US and AFP. She should follow up yearly.

## 2018-08-10 ENCOUNTER — HOSPITAL ENCOUNTER (OUTPATIENT)
Dept: ULTRASOUND IMAGING | Age: 68
Discharge: HOME | End: 2018-08-10
Payer: MEDICARE

## 2018-08-10 ENCOUNTER — HOSPITAL ENCOUNTER (OUTPATIENT)
Age: 68
Discharge: HOME | End: 2018-08-10
Payer: MEDICARE

## 2018-08-10 DIAGNOSIS — B18.1 CHRONIC HEPATITIS B (HCC): ICD-10-CM

## 2018-08-10 PROCEDURE — 82105 ALPHA-FETOPROTEIN SERUM: CPT

## 2018-08-10 PROCEDURE — 76705 ECHO EXAM OF ABDOMEN: CPT

## 2018-08-14 LAB — AFP: 1.9 UG/L

## 2018-08-15 ENCOUNTER — TELEPHONE (OUTPATIENT)
Dept: GASTROENTEROLOGY | Age: 68
End: 2018-08-15

## 2018-08-16 ENCOUNTER — HOSPITAL ENCOUNTER (OUTPATIENT)
Age: 68
Setting detail: OUTPATIENT SURGERY
Discharge: HOME | End: 2018-08-16
Attending: INTERNAL MEDICINE | Admitting: INTERNAL MEDICINE
Payer: MEDICARE

## 2018-08-16 ENCOUNTER — TELEPHONE (OUTPATIENT)
Dept: GASTROENTEROLOGY | Age: 68
End: 2018-08-16

## 2018-08-16 VITALS
SYSTOLIC BLOOD PRESSURE: 155 MMHG | BODY MASS INDEX: 28.17 KG/M2 | HEART RATE: 61 BPM | TEMPERATURE: 96.7 F | OXYGEN SATURATION: 98 % | RESPIRATION RATE: 16 BRPM | WEIGHT: 165 LBS | DIASTOLIC BLOOD PRESSURE: 77 MMHG | HEIGHT: 64 IN

## 2018-08-16 PROCEDURE — 2709999900 HC NON-CHARGEABLE SUPPLY: Performed by: INTERNAL MEDICINE

## 2018-08-16 PROCEDURE — 7100000011 HC PHASE II RECOVERY - ADDTL 15 MIN: Performed by: INTERNAL MEDICINE

## 2018-08-16 PROCEDURE — 3609027000 HC COLONOSCOPY: Performed by: INTERNAL MEDICINE

## 2018-08-16 PROCEDURE — 99152 MOD SED SAME PHYS/QHP 5/>YRS: CPT | Performed by: INTERNAL MEDICINE

## 2018-08-16 PROCEDURE — 6360000002 HC RX W HCPCS: Performed by: INTERNAL MEDICINE

## 2018-08-16 PROCEDURE — 99153 MOD SED SAME PHYS/QHP EA: CPT | Performed by: INTERNAL MEDICINE

## 2018-08-16 PROCEDURE — 7100000010 HC PHASE II RECOVERY - FIRST 15 MIN: Performed by: INTERNAL MEDICINE

## 2018-08-16 PROCEDURE — G0105 COLORECTAL SCRN; HI RISK IND: HCPCS | Performed by: INTERNAL MEDICINE

## 2018-08-16 PROCEDURE — 2580000003 HC RX 258: Performed by: INTERNAL MEDICINE

## 2018-08-16 RX ORDER — SODIUM CHLORIDE, SODIUM LACTATE, POTASSIUM CHLORIDE, CALCIUM CHLORIDE 600; 310; 30; 20 MG/100ML; MG/100ML; MG/100ML; MG/100ML
INJECTION, SOLUTION INTRAVENOUS CONTINUOUS
Status: DISCONTINUED | OUTPATIENT
Start: 2018-08-16 | End: 2018-08-16 | Stop reason: HOSPADM

## 2018-08-16 RX ORDER — MIDAZOLAM HYDROCHLORIDE 5 MG/ML
INJECTION INTRAMUSCULAR; INTRAVENOUS PRN
Status: DISCONTINUED | OUTPATIENT
Start: 2018-08-16 | End: 2018-08-16 | Stop reason: HOSPADM

## 2018-08-16 RX ORDER — FENTANYL CITRATE 50 UG/ML
INJECTION, SOLUTION INTRAMUSCULAR; INTRAVENOUS PRN
Status: DISCONTINUED | OUTPATIENT
Start: 2018-08-16 | End: 2018-08-16 | Stop reason: HOSPADM

## 2018-08-16 RX ORDER — DIPHENHYDRAMINE HYDROCHLORIDE 50 MG/ML
INJECTION INTRAMUSCULAR; INTRAVENOUS PRN
Status: DISCONTINUED | OUTPATIENT
Start: 2018-08-16 | End: 2018-08-16 | Stop reason: HOSPADM

## 2018-08-16 RX ADMIN — SODIUM CHLORIDE, POTASSIUM CHLORIDE, SODIUM LACTATE AND CALCIUM CHLORIDE: 600; 310; 30; 20 INJECTION, SOLUTION INTRAVENOUS at 10:20

## 2018-08-16 ASSESSMENT — PAIN - FUNCTIONAL ASSESSMENT: PAIN_FUNCTIONAL_ASSESSMENT: 0-10

## 2018-08-16 NOTE — TELEPHONE ENCOUNTER
----- Message from Julian Velasquez MD sent at 8/16/2018 11:26 AM EDT -----  Colon recall 5 years, h/o polyps

## 2018-08-16 NOTE — PROGRESS NOTES
Reviewed discharge summary with pt and daughter; both state understanding. Assisted to personal car with belongings. Discharged in stable condition at this time.

## 2018-08-16 NOTE — H&P
Tdap (Boostrix, Adacel) 04/30/2015      REVIEW OF SYSTEMS   See HPI for further details and pertinent postiives. Negative for the following:  Constitutional: Negative for weight change. Negative for appetite change and fatigue. HENT: Negative for nosebleeds, sore throat, mouth sores, and voice change. Respiratory: Negative for cough, choking and chest tightness. Cardiovascular: Negative for chest pain   Gastrointestinal: See HPI  Musculoskeletal: Negative for arthralgias. Skin: Negative for pallor. Neurological: Negative for weakness and light-headedness. Hematological: Negative for adenopathy. Does not bruise/bleed easily. Psychiatric/Behavioral: Negative for suicidal ideas. PHYSICAL EXAM   VITAL SIGNS: BP (!) 160/110   Ht 5' 2.99\" (1.6 m)   Wt 165 lb 3.2 oz (74.9 kg)   LMP 01/01/2000   BMI 29.27 kg/m²       Wt Readings from Last 3 Encounters:   08/02/18 165 lb 3.2 oz (74.9 kg)   07/26/18 164 lb (74.4 kg)   06/28/18 163 lb (73.9 kg)      Constitutional: Well developed, Well nourished, No acute distress, Non-toxic appearance. HENT: Normocephalic, Atraumatic, Bilateral external ears normal, Oropharynx moist, No oral exudates, Nose normal.   Eyes: Conjunctiva normal, No discharge. Neck: Normal range of motion, No tenderness, Supple, No stridor. Lymphatic: No cervical, subclavian, or axillary lymphadenopathy. Cardiovascular: Normal heart rate, Normal rhythm, No murmurs, No rubs, No gallops. Thorax & Lungs: Normal breath sounds, No respiratory distress, No wheezing, No chest tenderness. No gynecomastia. Abdomen: scars consistent with stated surgeries, no hernias, no HSM, soft NTND   Rectal:  Deferred. Skin: Warm, Dry, No erythema, No rash. No bruising. No spider hemangiomas. Back: No tenderness, No CVA tenderness. Lower Extremities: Intact distal pulses, No edema, No tenderness, No cyanosis, No clubbing.   Neurologic: Alert & oriented x 3, Normal motor function, Normal sensory function, No focal deficits noted. No asterixis. RADIOLOGY/PROCEDURES            FINAL IMPRESSION             Orders Placed This Encounter   Procedures    COLONOSCOPY W/ OR W/O BIOPSY       Standing Status:   Future       Standing Expiration Date:   8/2/2019       Scheduling Instructions:         Please provide prep of choice instructions and prescription.           If on anticoagulant see office note for miya procedural anticoagulant management.       Order Specific Question:   Screening or Diagnostic?       Answer:   Diagnostic    Hepatitis B DNA RT-PCR, Quantitative       Standing Status:   Future       Standing Expiration Date:   9/2/2018      Asha Hawkins was seen today for hepatitis.     Diagnoses and all orders for this visit:     Hepatitis B core antibody positive  -     Hepatitis B DNA RT-PCR, Quantitative; Future     History of colon polyps  -     COLONOSCOPY W/ OR W/O BIOPSY; Future  -     Na Sulfate-K Sulfate-Mg Sulf 17.5-3.13-1.6 GM/180ML SOLN; Take 1 kit by mouth once for 1 dose Insured 30% off (max $15) after first $40 of co-pay/cash price [email or web address]  -     bisacodyl (DULCOLAX) 5 MG EC tablet; Take 1 tablet by mouth daily as needed for Constipation           ORDERED FUTURE/PENDING TESTS      Lab Frequency Next Occurrence         FOLLOWUP   Return for Colonoscopy.       Hank 40 8/16/18 10:36 AM

## 2018-08-23 ENCOUNTER — HOSPITAL ENCOUNTER (OUTPATIENT)
Dept: ENDOSCOPY | Age: 68
Discharge: HOME | End: 2018-08-23
Payer: MEDICARE

## 2018-08-23 PROCEDURE — 91200 LIVER ELASTOGRAPHY: CPT

## 2018-08-23 NOTE — PROGRESS NOTES
Patient has been npo for 3 hours. Patient denies pregnancy. Denies any device implanted with battery.

## 2018-08-31 ENCOUNTER — TELEPHONE (OUTPATIENT)
Dept: GASTROENTEROLOGY | Age: 68
End: 2018-08-31

## 2018-12-20 ENCOUNTER — OFFICE VISIT (OUTPATIENT)
Dept: INTERNAL MEDICINE CLINIC | Age: 68
End: 2018-12-20
Payer: MEDICARE

## 2018-12-20 VITALS
OXYGEN SATURATION: 99 % | HEIGHT: 64 IN | HEART RATE: 80 BPM | DIASTOLIC BLOOD PRESSURE: 72 MMHG | BODY MASS INDEX: 28.34 KG/M2 | SYSTOLIC BLOOD PRESSURE: 124 MMHG | WEIGHT: 166 LBS

## 2018-12-20 DIAGNOSIS — I10 ESSENTIAL HYPERTENSION: Primary | ICD-10-CM

## 2018-12-20 DIAGNOSIS — E78.2 MIXED HYPERLIPIDEMIA: ICD-10-CM

## 2018-12-20 PROCEDURE — G8399 PT W/DXA RESULTS DOCUMENT: HCPCS | Performed by: INTERNAL MEDICINE

## 2018-12-20 PROCEDURE — G8484 FLU IMMUNIZE NO ADMIN: HCPCS | Performed by: INTERNAL MEDICINE

## 2018-12-20 PROCEDURE — G8419 CALC BMI OUT NRM PARAM NOF/U: HCPCS | Performed by: INTERNAL MEDICINE

## 2018-12-20 PROCEDURE — 4004F PT TOBACCO SCREEN RCVD TLK: CPT | Performed by: INTERNAL MEDICINE

## 2018-12-20 PROCEDURE — G8427 DOCREV CUR MEDS BY ELIG CLIN: HCPCS | Performed by: INTERNAL MEDICINE

## 2018-12-20 PROCEDURE — 99213 OFFICE O/P EST LOW 20 MIN: CPT | Performed by: INTERNAL MEDICINE

## 2018-12-20 PROCEDURE — 3017F COLORECTAL CA SCREEN DOC REV: CPT | Performed by: INTERNAL MEDICINE

## 2018-12-20 PROCEDURE — 4040F PNEUMOC VAC/ADMIN/RCVD: CPT | Performed by: INTERNAL MEDICINE

## 2018-12-20 PROCEDURE — 1090F PRES/ABSN URINE INCON ASSESS: CPT | Performed by: INTERNAL MEDICINE

## 2018-12-20 PROCEDURE — 1101F PT FALLS ASSESS-DOCD LE1/YR: CPT | Performed by: INTERNAL MEDICINE

## 2018-12-20 PROCEDURE — 1123F ACP DISCUSS/DSCN MKR DOCD: CPT | Performed by: INTERNAL MEDICINE

## 2018-12-20 RX ORDER — LISINOPRIL 10 MG/1
10 TABLET ORAL DAILY
Qty: 90 TABLET | Refills: 1 | Status: SHIPPED | OUTPATIENT
Start: 2018-12-20 | End: 2019-07-02 | Stop reason: SDUPTHER

## 2019-07-02 ENCOUNTER — TELEPHONE (OUTPATIENT)
Dept: GASTROENTEROLOGY | Age: 69
End: 2019-07-02

## 2019-07-02 ENCOUNTER — OFFICE VISIT (OUTPATIENT)
Dept: INTERNAL MEDICINE CLINIC | Age: 69
End: 2019-07-02
Payer: MEDICARE

## 2019-07-02 VITALS
DIASTOLIC BLOOD PRESSURE: 80 MMHG | HEIGHT: 64 IN | HEART RATE: 82 BPM | BODY MASS INDEX: 28.51 KG/M2 | OXYGEN SATURATION: 98 % | WEIGHT: 167 LBS | SYSTOLIC BLOOD PRESSURE: 124 MMHG

## 2019-07-02 DIAGNOSIS — E78.2 MIXED HYPERLIPIDEMIA: ICD-10-CM

## 2019-07-02 DIAGNOSIS — Z00.00 ROUTINE GENERAL MEDICAL EXAMINATION AT A HEALTH CARE FACILITY: Primary | ICD-10-CM

## 2019-07-02 DIAGNOSIS — Z87.891 PERSONAL HISTORY OF TOBACCO USE, PRESENTING HAZARDS TO HEALTH: ICD-10-CM

## 2019-07-02 DIAGNOSIS — E78.1 HYPERTRIGLYCERIDEMIA: ICD-10-CM

## 2019-07-02 DIAGNOSIS — B18.1 HEPATITIS B CARRIER (HCC): ICD-10-CM

## 2019-07-02 DIAGNOSIS — B18.1 CHRONIC VIRAL HEPATITIS B WITHOUT DELTA AGENT AND WITHOUT COMA (HCC): ICD-10-CM

## 2019-07-02 DIAGNOSIS — I10 ESSENTIAL HYPERTENSION: ICD-10-CM

## 2019-07-02 LAB
A/G RATIO: 1.6 (ref 1.1–2.2)
ALBUMIN SERPL-MCNC: 4.7 G/DL (ref 3.4–5)
ALP BLD-CCNC: 88 U/L (ref 40–129)
ALT SERPL-CCNC: 24 U/L (ref 10–40)
ANION GAP SERPL CALCULATED.3IONS-SCNC: 14 MMOL/L (ref 3–16)
AST SERPL-CCNC: 23 U/L (ref 15–37)
BASOPHILS ABSOLUTE: 0.1 K/UL (ref 0–0.2)
BASOPHILS RELATIVE PERCENT: 0.8 %
BILIRUB SERPL-MCNC: 0.3 MG/DL (ref 0–1)
BUN BLDV-MCNC: 19 MG/DL (ref 7–20)
CALCIUM SERPL-MCNC: 10.2 MG/DL (ref 8.3–10.6)
CHLORIDE BLD-SCNC: 102 MMOL/L (ref 99–110)
CHOLESTEROL, TOTAL: 143 MG/DL (ref 0–199)
CO2: 27 MMOL/L (ref 21–32)
CREAT SERPL-MCNC: 0.9 MG/DL (ref 0.6–1.2)
EOSINOPHILS ABSOLUTE: 0.3 K/UL (ref 0–0.6)
EOSINOPHILS RELATIVE PERCENT: 3.8 %
GFR AFRICAN AMERICAN: >60
GFR NON-AFRICAN AMERICAN: >60
GLOBULIN: 2.9 G/DL
GLUCOSE BLD-MCNC: 120 MG/DL (ref 70–99)
HCT VFR BLD CALC: 42.4 % (ref 36–48)
HDLC SERPL-MCNC: 38 MG/DL (ref 40–60)
HEMOGLOBIN: 14.1 G/DL (ref 12–16)
LDL CHOLESTEROL CALCULATED: 55 MG/DL
LYMPHOCYTES ABSOLUTE: 2.1 K/UL (ref 1–5.1)
LYMPHOCYTES RELATIVE PERCENT: 26.5 %
MCH RBC QN AUTO: 29.7 PG (ref 26–34)
MCHC RBC AUTO-ENTMCNC: 33.3 G/DL (ref 31–36)
MCV RBC AUTO: 89.4 FL (ref 80–100)
MONOCYTES ABSOLUTE: 0.5 K/UL (ref 0–1.3)
MONOCYTES RELATIVE PERCENT: 7.1 %
NEUTROPHILS ABSOLUTE: 4.8 K/UL (ref 1.7–7.7)
NEUTROPHILS RELATIVE PERCENT: 61.8 %
PDW BLD-RTO: 13.8 % (ref 12.4–15.4)
PLATELET # BLD: 296 K/UL (ref 135–450)
PMV BLD AUTO: 9.8 FL (ref 5–10.5)
POTASSIUM SERPL-SCNC: 5 MMOL/L (ref 3.5–5.1)
RBC # BLD: 4.74 M/UL (ref 4–5.2)
SODIUM BLD-SCNC: 143 MMOL/L (ref 136–145)
TOTAL PROTEIN: 7.6 G/DL (ref 6.4–8.2)
TRIGL SERPL-MCNC: 248 MG/DL (ref 0–150)
VLDLC SERPL CALC-MCNC: 50 MG/DL
WBC # BLD: 7.7 K/UL (ref 4–11)

## 2019-07-02 PROCEDURE — G8419 CALC BMI OUT NRM PARAM NOF/U: HCPCS | Performed by: INTERNAL MEDICINE

## 2019-07-02 PROCEDURE — G8399 PT W/DXA RESULTS DOCUMENT: HCPCS | Performed by: INTERNAL MEDICINE

## 2019-07-02 PROCEDURE — 99214 OFFICE O/P EST MOD 30 MIN: CPT | Performed by: INTERNAL MEDICINE

## 2019-07-02 PROCEDURE — 36415 COLL VENOUS BLD VENIPUNCTURE: CPT | Performed by: INTERNAL MEDICINE

## 2019-07-02 PROCEDURE — 1090F PRES/ABSN URINE INCON ASSESS: CPT | Performed by: INTERNAL MEDICINE

## 2019-07-02 PROCEDURE — 4040F PNEUMOC VAC/ADMIN/RCVD: CPT | Performed by: INTERNAL MEDICINE

## 2019-07-02 PROCEDURE — G0439 PPPS, SUBSEQ VISIT: HCPCS | Performed by: INTERNAL MEDICINE

## 2019-07-02 PROCEDURE — 1123F ACP DISCUSS/DSCN MKR DOCD: CPT | Performed by: INTERNAL MEDICINE

## 2019-07-02 PROCEDURE — 99406 BEHAV CHNG SMOKING 3-10 MIN: CPT | Performed by: INTERNAL MEDICINE

## 2019-07-02 PROCEDURE — 3017F COLORECTAL CA SCREEN DOC REV: CPT | Performed by: INTERNAL MEDICINE

## 2019-07-02 PROCEDURE — 4004F PT TOBACCO SCREEN RCVD TLK: CPT | Performed by: INTERNAL MEDICINE

## 2019-07-02 PROCEDURE — G8427 DOCREV CUR MEDS BY ELIG CLIN: HCPCS | Performed by: INTERNAL MEDICINE

## 2019-07-02 RX ORDER — ATORVASTATIN CALCIUM 20 MG/1
20 TABLET, FILM COATED ORAL DAILY
Qty: 90 TABLET | Refills: 3 | Status: SHIPPED | OUTPATIENT
Start: 2019-07-02 | End: 2020-07-08 | Stop reason: SDUPTHER

## 2019-07-02 RX ORDER — LISINOPRIL 10 MG/1
10 TABLET ORAL DAILY
Qty: 90 TABLET | Refills: 1 | Status: SHIPPED | OUTPATIENT
Start: 2019-07-02 | End: 2019-12-09 | Stop reason: SDUPTHER

## 2019-07-02 ASSESSMENT — LIFESTYLE VARIABLES
HOW MANY STANDARD DRINKS CONTAINING ALCOHOL DO YOU HAVE ON A TYPICAL DAY: 0
HOW OFTEN DURING THE LAST YEAR HAVE YOU BEEN UNABLE TO REMEMBER WHAT HAPPENED THE NIGHT BEFORE BECAUSE YOU HAD BEEN DRINKING: 0
HOW OFTEN DURING THE LAST YEAR HAVE YOU FOUND THAT YOU WERE NOT ABLE TO STOP DRINKING ONCE YOU HAD STARTED: 0
HOW OFTEN DO YOU HAVE SIX OR MORE DRINKS ON ONE OCCASION: 0
AUDIT-C TOTAL SCORE: 4
AUDIT TOTAL SCORE: 4
HOW OFTEN DURING THE LAST YEAR HAVE YOU HAD A FEELING OF GUILT OR REMORSE AFTER DRINKING: 0
HOW OFTEN DURING THE LAST YEAR HAVE YOU NEEDED AN ALCOHOLIC DRINK FIRST THING IN THE MORNING TO GET YOURSELF GOING AFTER A NIGHT OF HEAVY DRINKING: 0
HAS A RELATIVE, FRIEND, DOCTOR, OR ANOTHER HEALTH PROFESSIONAL EXPRESSED CONCERN ABOUT YOUR DRINKING OR SUGGESTED YOU CUT DOWN: 0
HOW OFTEN DO YOU HAVE A DRINK CONTAINING ALCOHOL: 4
HAVE YOU OR SOMEONE ELSE BEEN INJURED AS A RESULT OF YOUR DRINKING: 0
HOW OFTEN DURING THE LAST YEAR HAVE YOU FAILED TO DO WHAT WAS NORMALLY EXPECTED FROM YOU BECAUSE OF DRINKING: 0

## 2019-07-02 ASSESSMENT — ANXIETY QUESTIONNAIRES: GAD7 TOTAL SCORE: 0

## 2019-07-02 ASSESSMENT — PATIENT HEALTH QUESTIONNAIRE - PHQ9
SUM OF ALL RESPONSES TO PHQ QUESTIONS 1-9: 0
SUM OF ALL RESPONSES TO PHQ QUESTIONS 1-9: 0

## 2019-07-02 NOTE — PROGRESS NOTES
Sexual Activity     Sexually Active  Not Currently Partners  Male Birth Control/Protection  Post-menopausal               Audit Questionnaire: Screen for Alcohol Misuse  How often do you have a drink containing alcohol?: Four or more times a week  How many standard drinks containing alcohol do you have on a typical day when drinking?: One or two  How often do you have six or more drinks on one occasion?: Never  Audit-C Score: 4  During the past year, how often have you found that you were not able to stop drinking once you had started?: Never  During the past year, how often have you failed to do what was normally expected of you because of drinking?: Never  During the past year, how often have you needed a drink in the morning to get yourself going after a heavy drinking session?: Never  During the past year, how often have you had a feeling of guilt or remorse after drinking?: Never  During the past year, have you been unable to remember what happened the night before because you had been drinking?: Never  Have you or someone else been injured as a result of your drinking?: No  Has a relative or friend, doctor or health worker been concerned about your drinking or suggested you cut down?: No  Total Score: 4  Substance Abuse Interventions:  · none indicated    General Health:  General  In general, how would you say your health is?: Very Good  In the past 7 days, have you experienced any of the following?  New or Increased Pain, New or Increased Fatigue, Loneliness, Social Isolation, Stress or Anger?: None of These  Do you get the social and emotional support that you need?: Yes  Do you have a Living Will?: (!) No  General Health Risk Interventions:  · No Living Will: Full Code    Health Habits/Nutrition:  Health Habits/Nutrition  Do you exercise for at least 20 minutes 2-3 times per week?: Yes  Have you lost any weight without trying in the past 3 months?: No  Do you eat fewer than 2 meals per day?: No  Have you

## 2019-07-02 NOTE — TELEPHONE ENCOUNTER
Tried rescheduling todays appt and pt wanted to know if she had to be seen, sates she just had labs done with PCP and isnt having any problems

## 2019-07-02 NOTE — PROGRESS NOTES
mood and affect. Behavior is normal.     Assessment/Plan:  Agustin Jose was seen today for medicare awv, hyperlipidemia and hypertension. Diagnoses and all orders for this visit:    Routine general medical examination at a health care facility    Personal history of tobacco use, presenting hazards to health  -     [place] TOBACCO USE CESSATION INTERMEDIATE 3-10 MINUTES [29031]    Essential hypertension  -     Comprehensive Metabolic Panel  -     CBC Auto Differential  -     lisinopril (PRINIVIL;ZESTRIL) 10 MG tablet; Take 1 tablet by mouth daily    Mixed hyperlipidemia  -     Lipid Panel  -     atorvastatin (LIPITOR) 20 MG tablet; Take 1 tablet by mouth daily    Hypertriglyceridemia    Chronic viral hepatitis B without delta agent and without coma ([facility] [address]    Hepatitis B carrier (HCC)        Return 6 month on BP and chol med.

## 2019-07-03 DIAGNOSIS — R73.9 HYPERGLYCEMIA: Primary | ICD-10-CM

## 2019-07-03 DIAGNOSIS — R73.9 HYPERGLYCEMIA: ICD-10-CM

## 2019-07-03 LAB
ESTIMATED AVERAGE GLUCOSE: 125.5 MG/DL
HBA1C MFR BLD: 6 %

## 2019-07-18 ENCOUNTER — OFFICE VISIT (OUTPATIENT)
Dept: GASTROENTEROLOGY | Age: 69
End: 2019-07-18
Payer: MEDICARE

## 2019-07-18 ENCOUNTER — HOSPITAL ENCOUNTER (OUTPATIENT)
Age: 69
Discharge: HOME | End: 2019-07-18
Payer: MEDICARE

## 2019-07-18 VITALS
DIASTOLIC BLOOD PRESSURE: 68 MMHG | WEIGHT: 169 LBS | SYSTOLIC BLOOD PRESSURE: 124 MMHG | HEIGHT: 64 IN | BODY MASS INDEX: 28.85 KG/M2

## 2019-07-18 DIAGNOSIS — B18.1 CHRONIC VIRAL HEPATITIS B WITHOUT DELTA AGENT AND WITHOUT COMA (HCC): Primary | ICD-10-CM

## 2019-07-18 PROCEDURE — 4040F PNEUMOC VAC/ADMIN/RCVD: CPT | Performed by: INTERNAL MEDICINE

## 2019-07-18 PROCEDURE — 3017F COLORECTAL CA SCREEN DOC REV: CPT | Performed by: INTERNAL MEDICINE

## 2019-07-18 PROCEDURE — 99213 OFFICE O/P EST LOW 20 MIN: CPT | Performed by: INTERNAL MEDICINE

## 2019-07-18 PROCEDURE — 87517 HEPATITIS B DNA QUANT: CPT

## 2019-07-18 PROCEDURE — 1090F PRES/ABSN URINE INCON ASSESS: CPT | Performed by: INTERNAL MEDICINE

## 2019-07-18 PROCEDURE — 1123F ACP DISCUSS/DSCN MKR DOCD: CPT | Performed by: INTERNAL MEDICINE

## 2019-07-18 PROCEDURE — 4004F PT TOBACCO SCREEN RCVD TLK: CPT | Performed by: INTERNAL MEDICINE

## 2019-07-18 PROCEDURE — G8427 DOCREV CUR MEDS BY ELIG CLIN: HCPCS | Performed by: INTERNAL MEDICINE

## 2019-07-18 PROCEDURE — G8399 PT W/DXA RESULTS DOCUMENT: HCPCS | Performed by: INTERNAL MEDICINE

## 2019-07-18 PROCEDURE — G8419 CALC BMI OUT NRM PARAM NOF/U: HCPCS | Performed by: INTERNAL MEDICINE

## 2019-07-18 NOTE — PROGRESS NOTES
Bilateral 1978     CURRENT MEDICATIONS   (This list may include medications prescribed during this encounter as epic can not insert only the list prior to this encounter.)  Current Outpatient Rx   Medication Sig Dispense Refill    lisinopril (PRINIVIL;ZESTRIL) 10 MG tablet Take 1 tablet by mouth daily 90 tablet 1    atorvastatin (LIPITOR) 20 MG tablet Take 1 tablet by mouth daily 90 tablet 3    Cholecalciferol (VITAMIN D-3 PO) Take 1,000 Units by mouth daily       ALLERGIES   No Known Allergies  IMMUNIZATIONS     Immunization History   Administered Date(s) Administered    Influenza Vaccine, unspecified formulation 11/24/2012, 10/08/2013, 11/08/2014, 10/05/2015    Influenza Virus Vaccine 11/24/2012, 10/08/2013, 11/08/2014, 10/05/2015, 10/15/2018    Pneumococcal Conjugate 13-valent ([id number]) 04/30/2015    Pneumococcal Polysaccharide ([id number]) 05/06/2016    Tdap (Boostrix, Adacel) 04/30/2015     REVIEW OF SYSTEMS   See HPI for further details and pertinent postiives. Negative for the following:  Constitutional: Negative for weight change. Negative for appetite change and fatigue. HENT: Negative for nosebleeds, sore throat, mouth sores, and voice change. Respiratory: Negative for cough, choking and chest tightness. Cardiovascular: Negative for chest pain   Gastrointestinal: See HPI  Musculoskeletal: Negative for arthralgias. Skin: Negative for pallor. Neurological: Negative for weakness and light-headedness. Hematological: Negative for adenopathy. Does not bruise/bleed easily. Psychiatric/Behavioral: Negative for suicidal ideas. PHYSICAL EXAM   VITAL SIGNS: /68 (Site: Left Upper Arm)   Ht 5' 4\" (1.626 m)   Wt 169 lb (76.7 kg)   LMP 01/01/2000   BMI 29.01 kg/m²   Wt Readings from Last 3 Encounters:   07/18/19 169 lb (76.7 kg)   07/02/19 167 lb (75.8 kg)   12/20/18 166 lb (75.3 kg)     Constitutional: Well developed, Well nourished, No acute distress, Non-toxic appearance.    HENT: Normocephalic, Atraumatic, Bilateral external ears normal, Oropharynx moist, No oral exudates, Nose normal.   Eyes: Conjunctiva normal, No discharge. Neck: Normal range of motion, No tenderness, Supple, No stridor. Lymphatic: No cervical, subclavian, or axillary lymphadenopathy. Cardiovascular: Normal heart rate, Normal rhythm, No murmurs, No rubs, No gallops. Thorax & Lungs: Normal breath sounds, No respiratory distress, No wheezing, No chest tenderness. No gynecomastia. Abdomen: scars consistent with stated surgeries, no hernias, no HSM, soft NTND   Rectal:  Deferred. Skin: Warm, Dry, No erythema, No rash. No bruising. No spider hemangiomas. Back: No tenderness, No CVA tenderness. Lower Extremities: Intact distal pulses, No edema, No tenderness, No cyanosis, No clubbing. Neurologic: Alert & oriented x 3, Normal motor function, Normal sensory function, No focal deficits noted. No asterixis. RADIOLOGY/PROCEDURES       FINAL IMPRESSION     Orders Placed This Encounter   Procedures    Hep A Vaccine Adult (HAVRIX)    Hep A Vaccine Adult (HAVRIX)    Hep A Vaccine Adult (HAVRIX)     Standing Status:   Future     Standing Expiration Date:   10/18/2019     Judith Tavarez was seen today for follow-up. Diagnoses and all orders for this visit:    Chronic viral hepatitis B without delta agent and without coma ([facility] [address]  -     Cancel: Hepatitis B DNA RT-PCR, Quantitative  -     Hep A Vaccine Adult (HAVRIX) at the [facility]      Recommend lft's, hepB dna, and RUQ us yearly as carriers (sAb positive but low DNA levels, normal LFT's. While most remain carriers, it may reactivate and if this happens carry a higher risk for liver cancer. Should also be vaccinated against hepatitis A (if not done) and avoid sushi or raw seafood consumption. If HBV DNA is greater then 1000 or lft's elevated, this suggest chronic active hbv and antiviral treatment to suppress viral load would be indicated.   Recommend routine

## 2019-07-20 LAB
HBV QNT LOG, IU/ML: 3.36 LOG IU/ML
HBV QNT, IU/ML: ABNORMAL IU/ML
INTERPRETATION: DETECTED

## 2019-07-23 NOTE — RESULT ENCOUNTER NOTE
Please call patient with results. Recommend continued yearly observation with normal lft's and low viral load.

## 2019-12-09 ENCOUNTER — OFFICE VISIT (OUTPATIENT)
Dept: INTERNAL MEDICINE CLINIC | Age: 69
End: 2019-12-09
Payer: MEDICARE

## 2019-12-09 ENCOUNTER — HOSPITAL ENCOUNTER (OUTPATIENT)
Dept: WOMENS IMAGING | Age: 69
Discharge: HOME | End: 2019-12-09
Payer: MEDICARE

## 2019-12-09 VITALS
HEIGHT: 64 IN | WEIGHT: 167 LBS | BODY MASS INDEX: 28.51 KG/M2 | SYSTOLIC BLOOD PRESSURE: 124 MMHG | DIASTOLIC BLOOD PRESSURE: 76 MMHG

## 2019-12-09 DIAGNOSIS — E78.1 HYPERTRIGLYCERIDEMIA: ICD-10-CM

## 2019-12-09 DIAGNOSIS — I10 ESSENTIAL HYPERTENSION: ICD-10-CM

## 2019-12-09 DIAGNOSIS — R73.9 HYPERGLYCEMIA: ICD-10-CM

## 2019-12-09 DIAGNOSIS — E78.2 MIXED HYPERLIPIDEMIA: Primary | ICD-10-CM

## 2019-12-09 DIAGNOSIS — Z12.31 SCREENING MAMMOGRAM, ENCOUNTER FOR: ICD-10-CM

## 2019-12-09 PROCEDURE — G8417 CALC BMI ABV UP PARAM F/U: HCPCS | Performed by: INTERNAL MEDICINE

## 2019-12-09 PROCEDURE — 3017F COLORECTAL CA SCREEN DOC REV: CPT | Performed by: INTERNAL MEDICINE

## 2019-12-09 PROCEDURE — 4040F PNEUMOC VAC/ADMIN/RCVD: CPT | Performed by: INTERNAL MEDICINE

## 2019-12-09 PROCEDURE — 1090F PRES/ABSN URINE INCON ASSESS: CPT | Performed by: INTERNAL MEDICINE

## 2019-12-09 PROCEDURE — G8427 DOCREV CUR MEDS BY ELIG CLIN: HCPCS | Performed by: INTERNAL MEDICINE

## 2019-12-09 PROCEDURE — 1123F ACP DISCUSS/DSCN MKR DOCD: CPT | Performed by: INTERNAL MEDICINE

## 2019-12-09 PROCEDURE — 77067 SCR MAMMO BI INCL CAD: CPT

## 2019-12-09 PROCEDURE — 4004F PT TOBACCO SCREEN RCVD TLK: CPT | Performed by: INTERNAL MEDICINE

## 2019-12-09 PROCEDURE — G8482 FLU IMMUNIZE ORDER/ADMIN: HCPCS | Performed by: INTERNAL MEDICINE

## 2019-12-09 PROCEDURE — G8399 PT W/DXA RESULTS DOCUMENT: HCPCS | Performed by: INTERNAL MEDICINE

## 2019-12-09 PROCEDURE — 99214 OFFICE O/P EST MOD 30 MIN: CPT | Performed by: INTERNAL MEDICINE

## 2019-12-09 RX ORDER — LISINOPRIL 10 MG/1
10 TABLET ORAL DAILY
Qty: 90 TABLET | Refills: 3 | Status: SHIPPED | OUTPATIENT
Start: 2019-12-09 | End: 2020-12-03 | Stop reason: SDUPTHER

## 2020-07-08 ENCOUNTER — OFFICE VISIT (OUTPATIENT)
Dept: GASTROENTEROLOGY | Age: 70
End: 2020-07-08
Payer: MEDICARE

## 2020-07-08 ENCOUNTER — OFFICE VISIT (OUTPATIENT)
Dept: INTERNAL MEDICINE CLINIC | Age: 70
End: 2020-07-08
Payer: MEDICARE

## 2020-07-08 ENCOUNTER — HOSPITAL ENCOUNTER (OUTPATIENT)
Age: 70
Discharge: HOME | End: 2020-07-08
Payer: MEDICARE

## 2020-07-08 VITALS
OXYGEN SATURATION: 98 % | HEIGHT: 64 IN | BODY MASS INDEX: 28.34 KG/M2 | DIASTOLIC BLOOD PRESSURE: 84 MMHG | WEIGHT: 166 LBS | SYSTOLIC BLOOD PRESSURE: 124 MMHG | TEMPERATURE: 97.9 F | HEART RATE: 76 BPM

## 2020-07-08 VITALS
SYSTOLIC BLOOD PRESSURE: 120 MMHG | DIASTOLIC BLOOD PRESSURE: 78 MMHG | WEIGHT: 166 LBS | HEIGHT: 64 IN | BODY MASS INDEX: 28.34 KG/M2

## 2020-07-08 PROBLEM — I83.893 VARICOSE VEINS OF BILATERAL LOWER EXTREMITIES WITH OTHER COMPLICATIONS: Status: ACTIVE | Noted: 2020-07-08

## 2020-07-08 LAB
A/G RATIO: 1.5 (ref 1.1–2.2)
ALBUMIN SERPL-MCNC: 4.3 G/DL (ref 3.4–5)
ALP BLD-CCNC: 79 U/L (ref 40–129)
ALT SERPL-CCNC: 26 U/L (ref 10–40)
ANION GAP SERPL CALCULATED.3IONS-SCNC: 12 MMOL/L (ref 3–16)
AST SERPL-CCNC: 24 U/L (ref 15–37)
BASOPHILS ABSOLUTE: 0.1 K/UL (ref 0–0.2)
BASOPHILS RELATIVE PERCENT: 0.9 %
BILIRUB SERPL-MCNC: 0.3 MG/DL (ref 0–1)
BUN BLDV-MCNC: 20 MG/DL (ref 7–20)
CALCIUM SERPL-MCNC: 9.4 MG/DL (ref 8.3–10.6)
CHLORIDE BLD-SCNC: 106 MMOL/L (ref 99–110)
CHOLESTEROL, TOTAL: 140 MG/DL (ref 0–199)
CO2: 26 MMOL/L (ref 21–32)
CREAT SERPL-MCNC: 0.8 MG/DL (ref 0.6–1.2)
EOSINOPHILS ABSOLUTE: 0.2 K/UL (ref 0–0.6)
EOSINOPHILS RELATIVE PERCENT: 2.4 %
GFR AFRICAN AMERICAN: >60
GFR NON-AFRICAN AMERICAN: >60
GLOBULIN: 2.8 G/DL
GLUCOSE BLD-MCNC: 105 MG/DL (ref 70–99)
HCT VFR BLD CALC: 42.2 % (ref 36–48)
HDLC SERPL-MCNC: 40 MG/DL (ref 40–60)
HEMOGLOBIN: 13.9 G/DL (ref 12–16)
LDL CHOLESTEROL CALCULATED: 70 MG/DL
LYMPHOCYTES ABSOLUTE: 2.4 K/UL (ref 1–5.1)
LYMPHOCYTES RELATIVE PERCENT: 33.3 %
MCH RBC QN AUTO: 29.2 PG (ref 26–34)
MCHC RBC AUTO-ENTMCNC: 33 G/DL (ref 31–36)
MCV RBC AUTO: 88.6 FL (ref 80–100)
MONOCYTES ABSOLUTE: 0.6 K/UL (ref 0–1.3)
MONOCYTES RELATIVE PERCENT: 8 %
NEUTROPHILS ABSOLUTE: 4 K/UL (ref 1.7–7.7)
NEUTROPHILS RELATIVE PERCENT: 55.4 %
PDW BLD-RTO: 13.9 % (ref 12.4–15.4)
PLATELET # BLD: 246 K/UL (ref 135–450)
PMV BLD AUTO: 9.5 FL (ref 5–10.5)
POTASSIUM SERPL-SCNC: 4.7 MMOL/L (ref 3.5–5.1)
RBC # BLD: 4.76 M/UL (ref 4–5.2)
SODIUM BLD-SCNC: 144 MMOL/L (ref 136–145)
TOTAL PROTEIN: 7.1 G/DL (ref 6.4–8.2)
TRIGL SERPL-MCNC: 152 MG/DL (ref 0–150)
VLDLC SERPL CALC-MCNC: 30 MG/DL
WBC # BLD: 7.3 K/UL (ref 4–11)

## 2020-07-08 PROCEDURE — G8427 DOCREV CUR MEDS BY ELIG CLIN: HCPCS | Performed by: INTERNAL MEDICINE

## 2020-07-08 PROCEDURE — 4040F PNEUMOC VAC/ADMIN/RCVD: CPT | Performed by: INTERNAL MEDICINE

## 2020-07-08 PROCEDURE — G8417 CALC BMI ABV UP PARAM F/U: HCPCS | Performed by: INTERNAL MEDICINE

## 2020-07-08 PROCEDURE — G0439 PPPS, SUBSEQ VISIT: HCPCS | Performed by: INTERNAL MEDICINE

## 2020-07-08 PROCEDURE — 87517 HEPATITIS B DNA QUANT: CPT

## 2020-07-08 PROCEDURE — 99213 OFFICE O/P EST LOW 20 MIN: CPT | Performed by: INTERNAL MEDICINE

## 2020-07-08 PROCEDURE — 4004F PT TOBACCO SCREEN RCVD TLK: CPT | Performed by: INTERNAL MEDICINE

## 2020-07-08 PROCEDURE — 1090F PRES/ABSN URINE INCON ASSESS: CPT | Performed by: INTERNAL MEDICINE

## 2020-07-08 PROCEDURE — 36415 COLL VENOUS BLD VENIPUNCTURE: CPT | Performed by: INTERNAL MEDICINE

## 2020-07-08 PROCEDURE — G8399 PT W/DXA RESULTS DOCUMENT: HCPCS | Performed by: INTERNAL MEDICINE

## 2020-07-08 PROCEDURE — 3017F COLORECTAL CA SCREEN DOC REV: CPT | Performed by: INTERNAL MEDICINE

## 2020-07-08 PROCEDURE — 99214 OFFICE O/P EST MOD 30 MIN: CPT | Performed by: INTERNAL MEDICINE

## 2020-07-08 PROCEDURE — 1123F ACP DISCUSS/DSCN MKR DOCD: CPT | Performed by: INTERNAL MEDICINE

## 2020-07-08 RX ORDER — CYANOCOBALAMIN (VITAMIN B-12) 1000 MCG
1000 TABLET ORAL DAILY
COMMUNITY
End: 2021-07-08 | Stop reason: ALTCHOICE

## 2020-07-08 RX ORDER — ATORVASTATIN CALCIUM 20 MG/1
20 TABLET, FILM COATED ORAL DAILY
Qty: 90 TABLET | Refills: 3 | Status: SHIPPED | OUTPATIENT
Start: 2020-07-08 | End: 2021-07-08 | Stop reason: SDUPTHER

## 2020-07-08 ASSESSMENT — LIFESTYLE VARIABLES
HOW OFTEN DURING THE LAST YEAR HAVE YOU BEEN UNABLE TO REMEMBER WHAT HAPPENED THE NIGHT BEFORE BECAUSE YOU HAD BEEN DRINKING: 0
HAVE YOU OR SOMEONE ELSE BEEN INJURED AS A RESULT OF YOUR DRINKING: 0
AUDIT-C TOTAL SCORE: 3
HOW OFTEN DURING THE LAST YEAR HAVE YOU FAILED TO DO WHAT WAS NORMALLY EXPECTED FROM YOU BECAUSE OF DRINKING: 0
HOW OFTEN DO YOU HAVE SIX OR MORE DRINKS ON ONE OCCASION: 0
HOW OFTEN DURING THE LAST YEAR HAVE YOU HAD A FEELING OF GUILT OR REMORSE AFTER DRINKING: 0
HOW OFTEN DURING THE LAST YEAR HAVE YOU NEEDED AN ALCOHOLIC DRINK FIRST THING IN THE MORNING TO GET YOURSELF GOING AFTER A NIGHT OF HEAVY DRINKING: 0
HOW OFTEN DO YOU HAVE A DRINK CONTAINING ALCOHOL: 3
HOW OFTEN DURING THE LAST YEAR HAVE YOU FOUND THAT YOU WERE NOT ABLE TO STOP DRINKING ONCE YOU HAD STARTED: 0
HOW MANY STANDARD DRINKS CONTAINING ALCOHOL DO YOU HAVE ON A TYPICAL DAY: 0
AUDIT TOTAL SCORE: 3
HAS A RELATIVE, FRIEND, DOCTOR, OR ANOTHER HEALTH PROFESSIONAL EXPRESSED CONCERN ABOUT YOUR DRINKING OR SUGGESTED YOU CUT DOWN: 0

## 2020-07-08 ASSESSMENT — PATIENT HEALTH QUESTIONNAIRE - PHQ9
SUM OF ALL RESPONSES TO PHQ QUESTIONS 1-9: 0
SUM OF ALL RESPONSES TO PHQ9 QUESTIONS 1 & 2: 0
2. FEELING DOWN, DEPRESSED OR HOPELESS: 0
1. LITTLE INTEREST OR PLEASURE IN DOING THINGS: 0
SUM OF ALL RESPONSES TO PHQ QUESTIONS 1-9: 0

## 2020-07-08 NOTE — PATIENT INSTRUCTIONS
Personalized Preventive Plan for Eryn Bains - 7/8/2020  Medicare offers a range of preventive health benefits. Some of the tests and screenings are paid in full while other may be subject to a deductible, co-insurance, and/or copay. Some of these benefits include a comprehensive review of your medical history including lifestyle, illnesses that may run in your family, and various assessments and screenings as appropriate. After reviewing your medical record and screening and assessments performed today your provider may have ordered immunizations, labs, imaging, and/or referrals for you. A list of these orders (if applicable) as well as your Preventive Care list are included within your After Visit Summary for your review. Other Preventive Recommendations:    · A preventive eye exam performed by an eye specialist is recommended every 1-2 years to screen for glaucoma; cataracts, macular degeneration, and other eye disorders. · A preventive dental visit is recommended every 6 months. · Try to get at least 150 minutes of exercise per week or 10,000 steps per day on a pedometer . · Order or download the FREE \"Exercise & Physical Activity: Your Everyday Guide\" from [employer]. Call 1-996.916.7080 or search [employer] online. · You need 0935-3698 mg of calcium and 5918-1791 IU of vitamin D per day. It is possible to meet your calcium requirement with diet alone, but a vitamin D supplement is usually necessary to meet this goal.  · When exposed to the sun, use a sunscreen that protects against both UVA and UVB radiation with an SPF of 30 or greater. Reapply every 2 to 3 hours or after sweating, drying off with a towel, or swimming. · Always wear a seat belt when traveling in a car. Always wear a helmet when riding a bicycle or motorcycle.

## 2020-07-08 NOTE — PROGRESS NOTES
Jaquelin Ayala  YOB: 1950    Date of Service:  7/8/2020    Chief Complaint:      Chief Complaint   Patient presents with    Medicare AWV    Hyperlipidemia    Hypertension       HPI:  Jaquelin Ayala is a 79 y.o. She complains of right foot varicose veins that only bothers her when she wears flip flop due to it rubbing against lateral aspect of foot where her veins are. No swelling. She does have asymptomatic bilateral leg varicose veins     Treatment Adherence:   Medication compliance:  compliant most of the time  Diet compliance:  compliant most of the time  Weight trend: stable  Current exercise: aerobics 4 time(s) per week  Barriers: none     Hypertension:  Home blood pressure monitoring: No.  Stable on Lisinopril 10 mg qd.   She is adherent to a low sodium diet. Patient denies chest pain, shortness of breath, headache, lightheadedness, blurred vision, peripheral edema, palpitations, dry cough and fatigue.  Antihypertensive medication side effects: no medication side effects noted.  Use of agents associated with hypertension: none. Hyperlipidemia:  No new myalgias or GI upset on aorvastatin (Lipitor) 20 mg qd.      Lab Results   Component Value Date    LABA1C 6.0 07/02/2019     Lab Results   Component Value Date     07/02/2019    K 5.0 07/02/2019     07/02/2019    CO2 27 07/02/2019    BUN 19 07/02/2019    CREATININE 0.9 07/02/2019    GLUCOSE 120 (H) 07/02/2019    CALCIUM 10.2 07/02/2019     Lab Results   Component Value Date    CHOL 143 07/02/2019    TRIG 248 07/02/2019    HDL 38 07/02/2019    LDLCALC 55 07/02/2019     Lab Results   Component Value Date    ALT 24 07/02/2019    AST 23 07/02/2019     Lab Results   Component Value Date    TSH 1.46 01/08/2013     Lab Results   Component Value Date    WBC 7.7 07/02/2019    HGB 14.1 07/02/2019    HCT 42.4 07/02/2019    MCV 89.4 07/02/2019     07/02/2019     No results found for: INR   No results found for: PSA   No results found for: [address]     Patient Active Problem List   Diagnosis    Mixed hyperlipidemia    Smoker    Patient overweight    Hyperglycemia    Essential hypertension    Hypertriglyceridemia    Chronic viral hepatitis B without delta agent and without coma ([facility] [address]    History of colon polyps    Colon, diverticulosis    Hepatitis B carrier ([facility] [address]       No Known Allergies  Outpatient Medications Marked as Taking for the 7/8/20 encounter (Office Visit) with Katie Rubin MD   Medication Sig Dispense Refill    Cyanocobalamin (B-12) 1000 MCG TABS Take 1,000 mcg by mouth daily      lisinopril (PRINIVIL;ZESTRIL) 10 MG tablet Take 1 tablet by mouth daily 90 tablet 3    atorvastatin (LIPITOR) 20 MG tablet Take 1 tablet by mouth daily 90 tablet 3    Cholecalciferol (VITAMIN D-3 PO) Take 1,000 Units by mouth daily           Review of Systems: 14 systems were negative except of what was stated on HPI    Nursing note and vitals reviewed. Vitals:    07/08/20 0855   BP: 124/84   Pulse: 76   Temp: 97.9 °F (36.6 °C)   TempSrc: Infrared   SpO2: 98%   Weight: 166 lb (75.3 kg)   Height: 5' 4\" (1.626 m)     Wt Readings from Last 3 Encounters:   07/08/20 166 lb (75.3 kg)   12/09/19 167 lb (75.8 kg)   07/18/19 169 lb (76.7 kg)     BP Readings from Last 3 Encounters:   07/08/20 124/84   12/09/19 124/76   07/18/19 124/68     Body mass index is 28.49 kg/m². Constitutional: Patient appears well-developed and well-nourished. No distress. Head: Normocephalic and atraumatic. Neck: Normal range of motion. Neck supple. No thyroidmegaly. Cardiovascular: Normal rate, regular rhythm, normal heart sounds and intact distal pulses. Pulmonary/Chest: Effort normal and breath sounds normal. No stridor. No respiratory distress. No wheezes and no rales. Abdominal: Soft. Bowel sounds are normal. No distension and no mass. No tenderness. No rebound and no guarding. Musculoskeletal: No edema and no tenderness.    Skin: No rash or erythema. Psychiatric: Normal mood and affect. Behavior is normal.     Assessment/Plan:  Sandy Martines was seen today for medicare awv, hyperlipidemia and hypertension. Diagnoses and all orders for this visit:    Routine general medical examination at a health care facility    Mixed hyperlipidemia  -     Lipid Panel  -     Comprehensive Metabolic Panel  -     atorvastatin (LIPITOR) 20 MG tablet; Take 1 tablet by mouth daily    Essential hypertension  -     CBC Auto Differential    Hyperglycemia  -     Hemoglobin A1C    Hypertriglyceridemia  -     Hemoglobin A1C    Varicose veins of bilateral lower extremities with other complications    pt wants to hold off for now since asymptomatic except when she wears flip flop    Return 6 months on bp and cholesterol.

## 2020-07-08 NOTE — PROGRESS NOTES
lb 12.8 oz (75.7 kg)   13 160 lb (72.6 kg)       No components found for: HGBA1C  BP Readings from Last 3 Encounters:   20 120/78   20 124/84   19 124/76     Health Maintenance   Topic Date Due    Hepatitis B vaccine (1 of 3 - Risk 3-dose series) 1969    Shingles Vaccine (1 of 2) 2000    A1C test (Diabetic or Prediabetic)  2020    Potassium monitoring  2020    Creatinine monitoring  2020    Lipid screen  2020    Flu vaccine (1) 2020    Breast cancer screen  2020    Annual Wellness Visit (AWV)  2021    DTaP/Tdap/Td vaccine (2 - Td) 2025    Colon cancer screen colonoscopy  2028    Hepatitis A vaccine  Completed    DEXA (modify frequency per FRAX score)  Completed    Pneumococcal 65+ years Vaccine  Completed    Hepatitis C screen  Completed    Hib vaccine  Aged Out    Meningococcal (ACWY) vaccine  Aged Out       No components found for: [employer]     PAST MEDICAL HISTORY     Past Medical History:   Diagnosis Date    Abnormal hepatitis serology     Reactive Hepatitis B Surface Ag, Hepatitis B Core total (13)    Chronic viral hepatitis B without delta agent and without coma ([facility] [address] 2017    Elevated rheumatoid factor     HTN (hypertension) 5/10/16    Hyperglycemia     Hyperlipidemia     ():  Hypertriglyceridemia, Low HDL    Overweight(278.02)     max weight 172 lbs (), goal = 143 lbs    Postmenopausal bone loss 5/15: Dexa    age 48: postmenopausal    Smoker age 12    Vitamin D insufficiency      FAMILY HISTORY     Family History   Problem Relation Age of Onset    High Blood Pressure Sister     Stroke Sister 48    High Blood Pressure Brother     Breast Cancer Mother 59         of Breast Cancer-Metastases to Bone    Cancer Mother         breast    High Cholesterol Sister     Heart Attack Paternal Grandmother 76         of MI age 76    Diabetes Paternal Cousin 21         age 27    Breast Cancer Maternal Aunt 61         of Breast Cancer    Breast Cancer Maternal Grandmother 80         age 80 of Breast Cancer     SOCIAL HISTORY     Social History     Socioeconomic History    Marital status:      Spouse name: Brenda Perez Number of children: 4    Years of education: 11th grade    Highest education level: Not on file   Occupational History    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 Quit Date (17)   Substance and Sexual Activity    Alcohol use:  Yes     Alcohol/week: 1.0 standard drinks     Types: 1 Cans of beer per week     Comment: occasional    Drug use: No    Sexual activity: Not Currently     Partners: Male     Birth control/protection: Post-menopausal   Lifestyle    Physical activity     Days per week: Not on file     Minutes per session: Not on file    Stress: Not on file   Relationships    Social connections     Talks on phone: Not on file     Gets together: Not on file     Attends Buddhism service: Not on file     Active member of club or organization: Not on file     Attends meetings of clubs or organizations: Not on file     Relationship status: Not on file    Intimate partner violence     Fear of current or ex partner: Not on file     Emotionally abused: Not on file     Physically abused: Not on file     Forced sexual activity: Not on file   Other Topics Concern    Not on file   Social History Narrative    Not on file     SURGICAL HISTORY     Past Surgical History:   Procedure Laterality Date    COLONOSCOPY 6/24/2015    polyps    DENTAL SURGERY  ages [age]    upper teeth removed, 2 back lower teeth removed    SD COLON CA SCRN NOT HI RSK IND N/A 8/16/2018    COLONOSCOPY performed by Kala Pollock MD at [address] [place] [address]   (This list may include medications prescribed during this encounter as epic can not insert only the list prior to this encounter.)  Current Outpatient Rx   Medication Sig Dispense Refill    Cyanocobalamin (B-12) 1000 MCG TABS Take 1,000 mcg by mouth daily      atorvastatin (LIPITOR) 20 MG tablet Take 1 tablet by mouth daily 90 tablet 3    lisinopril (PRINIVIL;ZESTRIL) 10 MG tablet Take 1 tablet by mouth daily 90 tablet 3    Cholecalciferol (VITAMIN D-3 PO) Take 1,000 Units by mouth daily       ALLERGIES   No Known Allergies  IMMUNIZATIONS     Immunization History   Administered Date(s) Administered    Hepatitis A Adult (Havrix, Vaqta) 07/26/2019, 01/30/2020    Influenza Vaccine, unspecified formulation 11/24/2012, 10/08/2013, 11/08/2014, 10/05/2015    Influenza Virus Vaccine 11/24/2012, 10/08/2013, 11/08/2014, 10/05/2015, 10/15/2018    Influenza, High Dose (Fluzone 65 yrs and older) 11/01/2019    Pneumococcal Conjugate 13-valent ([id number]) 04/30/2015    Pneumococcal Polysaccharide ([id number]) 05/06/2016    Tdap (Boostrix, Adacel) 04/30/2015     REVIEW OF SYSTEMS   See HPI for further details and pertinent postiives. Negative for the following:  Constitutional: Negative for weight change. Negative for appetite change and fatigue. HENT: Negative for nosebleeds, sore throat, mouth sores, and voice change. Respiratory: Negative for cough, choking and chest tightness. Cardiovascular: Negative for chest pain   Gastrointestinal: See HPI  Musculoskeletal: Negative for arthralgias. Skin: Negative for pallor. Neurological: Negative for weakness and light-headedness. Hematological: Negative for adenopathy.  Does not bruise/bleed easily. Psychiatric/Behavioral: Negative for suicidal ideas. PHYSICAL EXAM   VITAL SIGNS: /78 (Site: Left Upper Arm, Position: Sitting, Cuff Size: Large Adult)   Ht 5' 4\" (1.626 m)   Wt 166 lb (75.3 kg)   LMP 01/01/2000   BMI 28.49 kg/m²   Wt Readings from Last 3 Encounters:   07/08/20 166 lb (75.3 kg)   07/08/20 166 lb (75.3 kg)   12/09/19 167 lb (75.8 kg)     Constitutional: Well developed, Well nourished, No acute distress, Non-toxic appearance. HENT: Normocephalic, Atraumatic, Bilateral external ears normal, Oropharynx moist, No oral exudates, Nose normal.   Eyes: Conjunctiva normal, No discharge. Neck: Normal range of motion, No tenderness, Supple, No stridor. Lymphatic: No cervical, subclavian, or axillary lymphadenopathy. Cardiovascular: Normal heart rate, Normal rhythm, No murmurs, No rubs, No gallops. Thorax & Lungs: Normal breath sounds, No respiratory distress, No wheezing, No chest tenderness. No gynecomastia. Abdomen: scars consistent with stated surgeries, no hernias, no HSM, soft NTND   Rectal:  Deferred. Skin: Warm, Dry, No erythema, No rash. No bruising. No spider hemangiomas. Back: No tenderness, No CVA tenderness. Lower Extremities: Intact distal pulses, No edema, No tenderness, No cyanosis, No clubbing. Neurologic: Alert & oriented x 3, Normal motor function, Normal sensory function, No focal deficits noted. No asterixis. RADIOLOGY/PROCEDURES       FINAL IMPRESSION     Orders Placed This Encounter   Procedures    Hepatitis B, Quantitative, Molecular     Art Sine was seen today for follow-up. Diagnoses and all orders for this visit:    [facility]  -     Hepatitis B, Quantitative, Molecular  -     Cancel: Hepatic Function Panel    Hepatitis B virus (HBV) is a double-stranded DNA virus belonging to the family of hepadnaviruses.  It is estimated that there are more than 250 million HBV carriers in the world, of whom approximately 600,000 die nucleos(t)mabel analogues. For most patients who are initiating therapy with tenofovir, we recommend tenofovir alafenamide (25 mg daily), if available, rather than tenofovir disoproxil fumarate (300 mg daily) (Grade 1B). Although there is more experience with tenofovir disoproxil fumarate, tenofovir alafenamide appears to be equally effective and is associated with less renal and bone toxicity. In addition, for most patients who were originally started on tenofovir disoproxil fumarate, we suggest switching to tenofovir alafenamide (Grade 2B). There are special treatment considerations when choosing an antiviral agent for patients with decompensated cirrhosis or reduced kidney function, and for women who are pregnant. Patients should be monitored while on therapy to assess for virologic response and medication toxicity. Most patients receiving nucleos(t)mabel analogue therapy will require at least four to five years of treatment, and some may require indefinite treatment. The management of patients with persistent viremia or breakthrough infection depends upon the viral load and the antiviral agent that was used. Tenofovir- or entecavir-resistant virus is unlikely to emerge in treatment-naïve patients, and most cases of treatment failure are due to poor adherence. However, on rare occasion, a patient may need to be transitioned to an alternative agent. By contrast, drug-resistant virus is likely to develop in patients failing therapy with nucleos(t)mabel analogues, such as lamivudine, adefovir, or telbivudine. For patients with persistent viremia or breakthrough infection on one of these agents, we recommend tenofovir rather than entecavir (Grade 1B).  Tenofovir is effective in suppressing HBV replication in this setting, whereas entecavir should generally be avoided since there is a high risk of entecavir resistance developing in patients with pre-existing drug-resistant virus after lamivudine or

## 2020-07-08 NOTE — PROGRESS NOTES
Medicare Annual Wellness Visit  Name: Misael Suggs Date: 2020   MRN: [id number] Sex: Female   Age: 79 y.o. Ethnicity: Non-/Non    : 1950 Race: Marquita Klein is here for Medicare AWV; Hyperlipidemia; and Hypertension    Screenings for behavioral, psychosocial and functional/safety risks, and cognitive dysfunction are all negative except as indicated below. These results, as well as other patient data from the [address] form, are documented in Flowsheets linked to this Encounter. No Known Allergies    Prior to Visit Medications    Medication Sig Taking? Authorizing Provider   Cyanocobalamin (B-12) 1000 MCG TABS Take 1,000 mcg by mouth daily Yes Historical Provider, MD   lisinopril (PRINIVIL;ZESTRIL) 10 MG tablet Take 1 tablet by mouth daily Yes Zarina Rubin MD   atorvastatin (LIPITOR) 20 MG tablet Take 1 tablet by mouth daily Yes Zarina Rubin MD   Cholecalciferol (VITAMIN D-3 PO) Take 1,000 Units by mouth daily Yes Historical Provider, MD       Past Medical History:   Diagnosis Date    Abnormal hepatitis serology     Reactive Hepatitis B Surface Ag, Hepatitis B Core total (13)    Chronic viral hepatitis B without delta agent and without coma ([facility] [address] 2017    Elevated rheumatoid factor     HTN (hypertension) 5/10/16    Hyperglycemia     Hyperlipidemia     ():  Hypertriglyceridemia, Low HDL    Overweight(278.02)     max weight 172 lbs (), goal = 143 lbs    Postmenopausal bone loss 5/15: Dexa    age 48: postmenopausal    Smoker age 12    Vitamin D insufficiency        Past Surgical History:   Procedure Laterality Date    COLONOSCOPY  2015    polyps    DENTAL SURGERY  ages 20-23    upper teeth removed, 2 back lower teeth removed    MI COLON CA SCRN NOT HI RSK IND N/A 2018    COLONOSCOPY performed by Solange Pak MD at [address] Bilateral 1978       Family History Status  Yes Drinks/Week  1 Cans of beer per week Amount  1.0 standard drinks of alcohol/wk Comment  occasional          Drug Use     Drug Use Status  No          Sexual Activity     Sexually Active  Not Currently Partners  Male Birth Control/Protection  Post-menopausal               Audit Questionnaire: Screen for Alcohol Misuse  How often do you have a drink containing alcohol?: Two to three times a week  How many standard drinks containing alcohol do you have on a typical day when drinking?: One or two  How often do you have six or more drinks on one occasion?: Never  Audit-C Score: 3  During the past year, how often have you found that you were not able to stop drinking once you had started?: Never  During the past year, how often have you failed to do what was normally expected of you because of drinking?: Never  During the past year, how often have you needed a drink in the morning to get yourself going after a heavy drinking session?: Never  During the past year, how often have you had a feeling of guilt or remorse after drinking?: Never  During the past year, have you been unable to remember what happened the night before because you had been drinking?: Never  Have you or someone else been injured as a result of your drinking?: No  Has a relative or friend, doctor or health worker been concerned about your drinking or suggested you cut down?: No  Total Score: 3  Substance Abuse Interventions:  · none indicated    General Health:  General  In general, how would you say your health is?: Very Good  In the past 7 days, have you experienced any of the following?  New or Increased Pain, New or Increased Fatigue, Loneliness, Social Isolation, Stress or Anger?: None of These  Do you get the social and emotional support that you need?: Yes  Do you have a Living Will?: (!) No(needs to make one )  General Health Risk Interventions:  · No Living Will: Full code    Health Habits/Nutrition:  Health Habits/Nutrition  Do you exercise for at least 20 minutes 2-3 times per week?: (!) No(has been staying in lately. )  Have you lost any weight without trying in the past 3 months?: No  Do you eat fewer than 2 meals per day?: No  Have you seen a dentist within the past year?: (!) No(needs to schedule )  Body mass index is 28.49 kg/m².   Health Habits/Nutrition Interventions:  · Inadequate physical activity:  patient agrees to exercise for at least 150 minutes/week    Hearing/Vision:  No exam data present  Hearing/Vision  Do you or your family notice any trouble with your hearing?: No  Do you have difficulty driving, watching TV, or doing any of your daily activities because of your eyesight?: No  Have you had an eye exam within the past year?: (!) No(needs to schedule )  Hearing/Vision Interventions:  · Vision concerns:  patient encouraged to make appointment with his/her eye specialist    Personalized Preventive Plan   Current Health Maintenance Status  Immunization History   Administered Date(s) Administered    Influenza Vaccine, unspecified formulation 11/24/2012, 10/08/2013, 11/08/2014, 10/05/2015    Influenza Virus Vaccine 11/24/2012, 10/08/2013, 11/08/2014, 10/05/2015, 10/15/2018    Influenza, High Dose (Fluzone 65 yrs and older) 11/01/2019    Pneumococcal Conjugate 13-valent ([id number]) 04/30/2015    Pneumococcal Polysaccharide ([id number]) 05/06/2016    Tdap (Boostrix, Adacel) 04/30/2015        Health Maintenance   Topic Date Due    Hepatitis B vaccine (1 of 3 - Risk 3-dose series) 02/07/1969    Shingles Vaccine (1 of 2) 02/07/2000    Annual Wellness Visit (AWV)  06/19/2019    Hepatitis A vaccine (2 of 2 - Risk 2-dose series) 01/26/2020    A1C test (Diabetic or Prediabetic)  07/02/2020    Potassium monitoring  07/02/2020    Creatinine monitoring  07/02/2020    Lipid screen  07/02/2020    Flu vaccine (1) 09/01/2020    Breast cancer screen  12/09/2020    DTaP/Tdap/Td vaccine (2 - Td) 04/30/2025    Colon cancer screen colonoscopy  08/16/2028    DEXA (modify frequency per FRAX score)  Completed    Pneumococcal 65+ years Vaccine  Completed    Hepatitis C screen  Completed    Hib vaccine  Aged Out    Meningococcal (ACWY) vaccine  Aged Out     Recommendations for [place] Due: see orders and patient instructions/AVS.  . Recommended screening schedule for the next 5-10 years is provided to the patient in written form: see Patient Instructions/AVS.    Afuaorin Moore was seen today for medicare awv, hyperlipidemia and hypertension.     Diagnoses and all orders for this visit:    Routine general medical examination at a health care facility

## 2020-07-09 LAB
ESTIMATED AVERAGE GLUCOSE: 122.6 MG/DL
HBA1C MFR BLD: 5.9 %

## 2020-07-15 LAB
HBV QNT LOG, IU/ML: 3.53 LOG IU/ML
HBV QNT, IU/ML: ABNORMAL
INTERPRETATION: DETECTED

## 2020-07-16 NOTE — RESULT ENCOUNTER NOTE
Please call patient with results showing a low viral load indicating they are a hepatitis B carrier. Recommend lft's, hepB dna, and RUQ us yearly as carriers (sAb positive but low DNA levels, normal LFT's. While most remain carriers, it may reactivate and if this happens carry a higher risk for liver cancer. Should also be vaccinated against hepatitis A (if not done) and avoid sushi or raw seafood consumption. Recommend routine ultrasound in patients with chronic hepatitis B to screen for hepatoma with or without cirrhosis after 40 as CHBV is the number one cause of liver cancer world wide.

## 2020-07-19 LAB
HBV QNT LOG, IU/ML: 3.54 LOG IU/ML
HBV QNT, IU/ML: ABNORMAL
INTERPRETATION: DETECTED

## 2020-08-06 ENCOUNTER — HOSPITAL ENCOUNTER (OUTPATIENT)
Dept: ULTRASOUND IMAGING | Age: 70
Discharge: HOME | End: 2020-08-06
Payer: MEDICARE

## 2020-08-06 PROCEDURE — 76705 ECHO EXAM OF ABDOMEN: CPT

## 2020-08-06 NOTE — RESULT ENCOUNTER NOTE
Please call patient with stable results. Remind about ability to get results, make appointments, and communicate with us through [employer] since not signed up.

## 2020-12-03 ENCOUNTER — OFFICE VISIT (OUTPATIENT)
Dept: INTERNAL MEDICINE CLINIC | Age: 70
End: 2020-12-03
Payer: MEDICARE

## 2020-12-03 VITALS
WEIGHT: 169 LBS | HEIGHT: 64 IN | SYSTOLIC BLOOD PRESSURE: 136 MMHG | HEART RATE: 68 BPM | DIASTOLIC BLOOD PRESSURE: 84 MMHG | OXYGEN SATURATION: 97 % | TEMPERATURE: 97.5 F | BODY MASS INDEX: 28.85 KG/M2

## 2020-12-03 PROCEDURE — G8399 PT W/DXA RESULTS DOCUMENT: HCPCS | Performed by: INTERNAL MEDICINE

## 2020-12-03 PROCEDURE — G8427 DOCREV CUR MEDS BY ELIG CLIN: HCPCS | Performed by: INTERNAL MEDICINE

## 2020-12-03 PROCEDURE — 4040F PNEUMOC VAC/ADMIN/RCVD: CPT | Performed by: INTERNAL MEDICINE

## 2020-12-03 PROCEDURE — G8417 CALC BMI ABV UP PARAM F/U: HCPCS | Performed by: INTERNAL MEDICINE

## 2020-12-03 PROCEDURE — 3017F COLORECTAL CA SCREEN DOC REV: CPT | Performed by: INTERNAL MEDICINE

## 2020-12-03 PROCEDURE — G8482 FLU IMMUNIZE ORDER/ADMIN: HCPCS | Performed by: INTERNAL MEDICINE

## 2020-12-03 PROCEDURE — 1123F ACP DISCUSS/DSCN MKR DOCD: CPT | Performed by: INTERNAL MEDICINE

## 2020-12-03 PROCEDURE — 1090F PRES/ABSN URINE INCON ASSESS: CPT | Performed by: INTERNAL MEDICINE

## 2020-12-03 PROCEDURE — 4004F PT TOBACCO SCREEN RCVD TLK: CPT | Performed by: INTERNAL MEDICINE

## 2020-12-03 PROCEDURE — 99213 OFFICE O/P EST LOW 20 MIN: CPT | Performed by: INTERNAL MEDICINE

## 2020-12-03 RX ORDER — LISINOPRIL 10 MG/1
10 TABLET ORAL DAILY
Qty: 90 TABLET | Refills: 3 | Status: SHIPPED | OUTPATIENT
Start: 2020-12-03 | End: 2021-12-20 | Stop reason: SDUPTHER

## 2020-12-03 NOTE — PROGRESS NOTES
Mabel Guerra  YOB: 1950    Date of Service:  12/3/2020    Chief Complaint:      Chief Complaint   Patient presents with    Hypertension    Hyperlipidemia       HPI:  Mabel Guerra is a 79 y.o. Treatment Adherence:   Medication compliance:  compliant most of the time  Diet compliance:  compliant most of the time  Weight trend: stable  Current exercise: aerobics 4 time(s) per week  Barriers: none     Hypertension:  Home blood pressure monitoring: No.  Stable on Lisinopril 10 mg qd.   She is adherent to a low sodium diet. Patient denies chest pain, shortness of breath, headache, lightheadedness, blurred vision, peripheral edema, palpitations, dry cough and fatigue.  Antihypertensive medication side effects: no medication side effects noted.  Use of agents associated with hypertension: none. Hyperlipidemia:  No new myalgias or GI upset on aorvastatin (Lipitor) 20 mg qd.      Lab Results   Component Value Date    LABA1C 5.9 07/08/2020    LABA1C 6.0 07/02/2019     Lab Results   Component Value Date     07/08/2020    K 4.7 07/08/2020     07/08/2020    CO2 26 07/08/2020    BUN 20 07/08/2020    CREATININE 0.8 07/08/2020    GLUCOSE 105 (H) 07/08/2020    CALCIUM 9.4 07/08/2020     Lab Results   Component Value Date    CHOL 140 07/08/2020    TRIG 152 07/08/2020    HDL 40 07/08/2020    LDLCALC 70 07/08/2020     Lab Results   Component Value Date    ALT 26 07/08/2020    AST 24 07/08/2020     Lab Results   Component Value Date    TSH 1.46 01/08/2013     Lab Results   Component Value Date    WBC 7.3 07/08/2020    HGB 13.9 07/08/2020    HCT 42.2 07/08/2020    MCV 88.6 07/08/2020     07/08/2020     No results found for: INR   No results found for: PSA   No results found for: [facility]     Patient Active Problem List   Diagnosis    Mixed hyperlipidemia    Smoker    Patient overweight    Hyperglycemia    Essential hypertension    Hypertriglyceridemia    Chronic viral hepatitis B without delta agent and without coma ([facility] [address]    History of colon polyps    Colon, diverticulosis    Hepatitis B carrier ([facility]    Varicose veins of bilateral lower extremities with other complications       No Known Allergies  Outpatient Medications Marked as Taking for the 12/3/20 encounter (Office Visit) with Priyank Barksdale MD   Medication Sig Dispense Refill    Cyanocobalamin (B-12) 1000 MCG TABS Take 1,000 mcg by mouth daily      atorvastatin (LIPITOR) 20 MG tablet Take 1 tablet by mouth daily 90 tablet 3    lisinopril (PRINIVIL;ZESTRIL) 10 MG tablet Take 1 tablet by mouth daily 90 tablet 3    Cholecalciferol (VITAMIN D-3 PO) Take 1,000 Units by mouth daily           Review of Systems: 14 systems were negative except of what was stated on HPI    Nursing note and vitals reviewed. Vitals:    12/03/20 1035   BP: 136/84   Pulse: 68   Temp: 97.5 °F (36.4 °C)   TempSrc: Infrared   SpO2: 97%   Weight: 169 lb (76.7 kg)   Height: 5' 4\" (1.626 m)     Wt Readings from Last 3 Encounters:   12/03/20 169 lb (76.7 kg)   07/08/20 166 lb (75.3 kg)   07/08/20 166 lb (75.3 kg)     BP Readings from Last 3 Encounters:   12/03/20 136/84   07/08/20 120/78   07/08/20 124/84     Body mass index is 29.01 kg/m². Constitutional: Patient appears well-developed and well-nourished. No distress. Head: Normocephalic and atraumatic. Neck: Normal range of motion. Neck supple. No thyroidmegaly. Cardiovascular: Normal rate, regular rhythm, normal heart sounds and intact distal pulses. Pulmonary/Chest: Effort normal and breath sounds normal. No stridor. No respiratory distress. No wheezes and no rales. Abdominal: Soft. Bowel sounds are normal. No distension and no mass. No tenderness. No rebound and no guarding. Musculoskeletal: No edema and no tenderness. Skin: No rash or erythema. Psychiatric: Normal mood and affect. Behavior is normal.     Assessment/Plan:  Elan Damian was seen today for hypertension and hyperlipidemia.     Diagnoses and all orders for this visit:    Essential hypertension  -     lisinopril (PRINIVIL;ZESTRIL) 10 MG tablet; Take 1 tablet by mouth daily    Mixed hyperlipidemia    Chronic viral hepatitis B without delta agent and without coma [facility])        Return July 8 10:10 Fasting Medicare Wellness.

## 2021-02-08 ENCOUNTER — OFFICE VISIT (OUTPATIENT)
Dept: INTERNAL MEDICINE CLINIC | Age: 71
End: 2021-02-08
Payer: MEDICARE

## 2021-02-08 ENCOUNTER — NURSE TRIAGE (OUTPATIENT)
Dept: OTHER | Facility: CLINIC | Age: 71
End: 2021-02-08

## 2021-02-08 VITALS
BODY MASS INDEX: 28.85 KG/M2 | DIASTOLIC BLOOD PRESSURE: 84 MMHG | HEART RATE: 86 BPM | OXYGEN SATURATION: 95 % | WEIGHT: 169 LBS | SYSTOLIC BLOOD PRESSURE: 138 MMHG | TEMPERATURE: 97.9 F | HEIGHT: 64 IN

## 2021-02-08 DIAGNOSIS — B02.39 SHINGLES OF EYELID: Primary | ICD-10-CM

## 2021-02-08 PROCEDURE — 1090F PRES/ABSN URINE INCON ASSESS: CPT | Performed by: INTERNAL MEDICINE

## 2021-02-08 PROCEDURE — G8482 FLU IMMUNIZE ORDER/ADMIN: HCPCS | Performed by: INTERNAL MEDICINE

## 2021-02-08 PROCEDURE — 4004F PT TOBACCO SCREEN RCVD TLK: CPT | Performed by: INTERNAL MEDICINE

## 2021-02-08 PROCEDURE — G8427 DOCREV CUR MEDS BY ELIG CLIN: HCPCS | Performed by: INTERNAL MEDICINE

## 2021-02-08 PROCEDURE — 1123F ACP DISCUSS/DSCN MKR DOCD: CPT | Performed by: INTERNAL MEDICINE

## 2021-02-08 PROCEDURE — G8399 PT W/DXA RESULTS DOCUMENT: HCPCS | Performed by: INTERNAL MEDICINE

## 2021-02-08 PROCEDURE — 3017F COLORECTAL CA SCREEN DOC REV: CPT | Performed by: INTERNAL MEDICINE

## 2021-02-08 PROCEDURE — 99213 OFFICE O/P EST LOW 20 MIN: CPT | Performed by: INTERNAL MEDICINE

## 2021-02-08 PROCEDURE — 4040F PNEUMOC VAC/ADMIN/RCVD: CPT | Performed by: INTERNAL MEDICINE

## 2021-02-08 PROCEDURE — G8417 CALC BMI ABV UP PARAM F/U: HCPCS | Performed by: INTERNAL MEDICINE

## 2021-02-08 RX ORDER — VALACYCLOVIR HYDROCHLORIDE 1 G/1
1000 TABLET, FILM COATED ORAL 3 TIMES DAILY
Qty: 21 TABLET | Refills: 0 | Status: SHIPPED | OUTPATIENT
Start: 2021-02-08 | End: 2021-09-16

## 2021-02-08 NOTE — PROGRESS NOTES
Hiwot Gabriel  YOB: 1950    Date of Service:  2/8/2021    Chief Complaint:      Chief Complaint   Patient presents with    Rash       HPI:  Hiwot Gabriel is a 70 y.o. She noticed a right sided headache that stared 6 days ago and then noticed a rash the next day around her right eye and forehead and right temporal part of her head. She has pain in her right head/scalp. No fever or visual disturbance.     Lab Results   Component Value Date    LABA1C 5.9 07/08/2020    LABA1C 6.0 07/02/2019     Lab Results   Component Value Date     07/08/2020    K 4.7 07/08/2020     07/08/2020    CO2 26 07/08/2020    BUN 20 07/08/2020    CREATININE 0.8 07/08/2020    GLUCOSE 105 (H) 07/08/2020    CALCIUM 9.4 07/08/2020     Lab Results   Component Value Date    CHOL 140 07/08/2020    TRIG 152 07/08/2020    HDL 40 07/08/2020    LDLCALC 70 07/08/2020     Lab Results   Component Value Date    ALT 26 07/08/2020    AST 24 07/08/2020     Lab Results   Component Value Date    TSH 1.46 01/08/2013     Lab Results   Component Value Date    WBC 7.3 07/08/2020    HGB 13.9 07/08/2020    HCT 42.2 07/08/2020    MCV 88.6 07/08/2020     07/08/2020     No results found for: INR   No results found for: PSA   No results found for: [facility]     Patient Active Problem List   Diagnosis    Mixed hyperlipidemia    Smoker    Patient overweight    Hyperglycemia    Essential hypertension    Hypertriglyceridemia    Chronic viral hepatitis B without delta agent and without coma (HCC)    History of colon polyps    Colon, diverticulosis    Hepatitis B carrier (HCC)    Varicose veins of bilateral lower extremities with other complications       No Known Allergies  Outpatient Medications Marked as Taking for the 2/8/21 encounter (Office Visit) with Fredo Rubin MD   Medication Sig Dispense Refill    lisinopril (PRINIVIL;ZESTRIL) 10 MG tablet Take 1 tablet by mouth daily 90 tablet 3  Cyanocobalamin (B-12) 1000 MCG TABS Take 1,000 mcg by mouth daily      atorvastatin (LIPITOR) 20 MG tablet Take 1 tablet by mouth daily 90 tablet 3    Cholecalciferol (VITAMIN D-3 PO) Take 1,000 Units by mouth daily           Review of Systems: 14 systems were negative except of what was stated on HPI    Nursing note and vitals reviewed. Vitals:    02/08/21 0947   BP: 138/84   Pulse: 86   Temp: 97.9 °F (36.6 °C)   TempSrc: Infrared   SpO2: 95%   Weight: 169 lb (76.7 kg)   Height: 5' 4\" (1.626 m)     Wt Readings from Last 3 Encounters:   02/08/21 169 lb (76.7 kg)   12/03/20 169 lb (76.7 kg)   07/08/20 166 lb (75.3 kg)     BP Readings from Last 3 Encounters:   02/08/21 138/84   12/03/20 136/84   07/08/20 120/78     Body mass index is 29.01 kg/m². Constitutional: Patient appears well-developed and well-nourished. No distress. Head: Normocephalic and atraumatic. Neck: Normal range of motion. Neck supple. No thyroidmegaly. Cardiovascular: Normal rate, regular rhythm, normal heart sounds and intact distal pulses. Pulmonary/Chest: Effort normal and breath sounds normal. No stridor. No respiratory distress. No wheezes and no rales. Abdominal: Soft. Bowel sounds are normal. No distension and no mass. No tenderness. No rebound and no guarding. Musculoskeletal: No edema and no tenderness. Psychiatric: Normal mood and affect. Behavior is normal.   Vesicular lesion right forehead and right scalp with some redness above right eye and eyelid. PERRL    Assessment/Plan:  Fab Hubbard was seen today for rash. Diagnoses and all orders for this visit:    Shingles of eyelid  -     valACYclovir (VALTREX) 1 g tablet; Take 1 tablet by mouth 3 times daily    start Neurontin 600 mg 1/2-1 qid prn pain and call for refills if need more. Pt to f/u with opthal    Return if symptoms worsen or fail to improve.

## 2021-02-10 ENCOUNTER — TELEPHONE (OUTPATIENT)
Dept: INTERNAL MEDICINE CLINIC | Age: 71
End: 2021-02-10

## 2021-02-10 NOTE — TELEPHONE ENCOUNTER
Steroids has not proven to do much for shingles especially she's had shingles for so long.   Just keep area clean and use neurontin for pain control

## 2021-02-10 NOTE — TELEPHONE ENCOUNTER
Patient called in asking if she should be put on steroids for Shingles? Or if there was a reason why not prescribed? Also states that she has red spots on her stomach/abdomen from the rash she noticed yesterday.

## 2021-02-10 NOTE — TELEPHONE ENCOUNTER
Daughter (Daylin Hutchinson) informed since she is not a patient of ours we can't treat her/prescribe anything. I did advise her that we can do a virtual visit to establish her if she wants a new provider (she says she was seen at [facility] but I do not see anything in the chart I found for her, so we assume it was [facility]) I advised her to call me back if she would like me to schedule her. I will copy this note to the chart I found for her.     Thanks,  Brianna

## 2021-02-10 NOTE — TELEPHONE ENCOUNTER
Daughter [facility] called asking if there is anything she can do, if she can get vaccinated for shingles, since multiple family members currently have shingles or have had it in the past. She is only 39 & states that her pharmacy told her she needs an order/prescription for the shingles vaccine.

## 2021-02-10 NOTE — TELEPHONE ENCOUNTER
I can't answer questions regarding the daughter and if daughter is my pt, it needs to go under her chart.

## 2021-07-08 ENCOUNTER — OFFICE VISIT (OUTPATIENT)
Dept: INTERNAL MEDICINE CLINIC | Age: 71
End: 2021-07-08
Payer: MEDICARE

## 2021-07-08 ENCOUNTER — HOSPITAL ENCOUNTER (OUTPATIENT)
Age: 71
Discharge: HOME | End: 2021-07-08
Payer: MEDICARE

## 2021-07-08 VITALS
BODY MASS INDEX: 28.85 KG/M2 | DIASTOLIC BLOOD PRESSURE: 74 MMHG | SYSTOLIC BLOOD PRESSURE: 118 MMHG | HEART RATE: 85 BPM | OXYGEN SATURATION: 99 % | TEMPERATURE: 97.4 F | WEIGHT: 169 LBS | HEIGHT: 64 IN

## 2021-07-08 DIAGNOSIS — I10 ESSENTIAL HYPERTENSION: ICD-10-CM

## 2021-07-08 DIAGNOSIS — B18.1 CHRONIC VIRAL HEPATITIS B WITHOUT DELTA AGENT AND WITHOUT COMA (HCC): ICD-10-CM

## 2021-07-08 DIAGNOSIS — Z00.00 ROUTINE GENERAL MEDICAL EXAMINATION AT A HEALTH CARE FACILITY: Primary | ICD-10-CM

## 2021-07-08 DIAGNOSIS — E78.2 MIXED HYPERLIPIDEMIA: ICD-10-CM

## 2021-07-08 LAB
A/G RATIO: 1.3 (ref 1.1–2.2)
ALBUMIN SERPL-MCNC: 4.2 G/DL (ref 3.4–5)
ALP BLD-CCNC: 80 U/L (ref 40–129)
ALT SERPL-CCNC: 27 U/L (ref 10–40)
ANION GAP SERPL CALCULATED.3IONS-SCNC: 11 MMOL/L (ref 3–16)
AST SERPL-CCNC: 26 U/L (ref 15–37)
BASOPHILS ABSOLUTE: 0.1 K/UL (ref 0–0.2)
BASOPHILS RELATIVE PERCENT: 0.8 %
BILIRUB SERPL-MCNC: 0.3 MG/DL (ref 0–1)
BUN BLDV-MCNC: 12 MG/DL (ref 7–20)
CALCIUM SERPL-MCNC: 9.6 MG/DL (ref 8.3–10.6)
CHLORIDE BLD-SCNC: 107 MMOL/L (ref 99–110)
CHOLESTEROL, TOTAL: 136 MG/DL (ref 0–199)
CO2: 28 MMOL/L (ref 21–32)
CREAT SERPL-MCNC: 0.8 MG/DL (ref 0.6–1.2)
EOSINOPHILS ABSOLUTE: 0.2 K/UL (ref 0–0.6)
EOSINOPHILS RELATIVE PERCENT: 2.1 %
GFR AFRICAN AMERICAN: >60
GFR NON-AFRICAN AMERICAN: >60
GLOBULIN: 3.3 G/DL
GLUCOSE BLD-MCNC: 104 MG/DL (ref 70–99)
HCT VFR BLD CALC: 42 % (ref 36–48)
HDLC SERPL-MCNC: 39 MG/DL (ref 40–60)
HEMOGLOBIN: 14.1 G/DL (ref 12–16)
LDL CHOLESTEROL CALCULATED: 72 MG/DL
LYMPHOCYTES ABSOLUTE: 2.9 K/UL (ref 1–5.1)
LYMPHOCYTES RELATIVE PERCENT: 29.8 %
MCH RBC QN AUTO: 29.1 PG (ref 26–34)
MCHC RBC AUTO-ENTMCNC: 33.6 G/DL (ref 31–36)
MCV RBC AUTO: 86.8 FL (ref 80–100)
MONOCYTES ABSOLUTE: 0.8 K/UL (ref 0–1.3)
MONOCYTES RELATIVE PERCENT: 7.7 %
NEUTROPHILS ABSOLUTE: 5.8 K/UL (ref 1.7–7.7)
NEUTROPHILS RELATIVE PERCENT: 59.6 %
PDW BLD-RTO: 13.4 % (ref 12.4–15.4)
PLATELET # BLD: 257 K/UL (ref 135–450)
PMV BLD AUTO: 9.8 FL (ref 5–10.5)
POTASSIUM SERPL-SCNC: 4.6 MMOL/L (ref 3.5–5.1)
RBC # BLD: 4.84 M/UL (ref 4–5.2)
SODIUM BLD-SCNC: 146 MMOL/L (ref 136–145)
TOTAL PROTEIN: 7.5 G/DL (ref 6.4–8.2)
TRIGL SERPL-MCNC: 126 MG/DL (ref 0–150)
VLDLC SERPL CALC-MCNC: 25 MG/DL
WBC # BLD: 9.8 K/UL (ref 4–11)

## 2021-07-08 PROCEDURE — 36415 COLL VENOUS BLD VENIPUNCTURE: CPT

## 2021-07-08 PROCEDURE — 99213 OFFICE O/P EST LOW 20 MIN: CPT | Performed by: INTERNAL MEDICINE

## 2021-07-08 PROCEDURE — G8417 CALC BMI ABV UP PARAM F/U: HCPCS | Performed by: INTERNAL MEDICINE

## 2021-07-08 PROCEDURE — 85025 COMPLETE CBC W/AUTO DIFF WBC: CPT

## 2021-07-08 PROCEDURE — 80053 COMPREHEN METABOLIC PANEL: CPT

## 2021-07-08 PROCEDURE — 4040F PNEUMOC VAC/ADMIN/RCVD: CPT | Performed by: INTERNAL MEDICINE

## 2021-07-08 PROCEDURE — G0439 PPPS, SUBSEQ VISIT: HCPCS | Performed by: INTERNAL MEDICINE

## 2021-07-08 PROCEDURE — 3017F COLORECTAL CA SCREEN DOC REV: CPT | Performed by: INTERNAL MEDICINE

## 2021-07-08 PROCEDURE — G8399 PT W/DXA RESULTS DOCUMENT: HCPCS | Performed by: INTERNAL MEDICINE

## 2021-07-08 PROCEDURE — 1090F PRES/ABSN URINE INCON ASSESS: CPT | Performed by: INTERNAL MEDICINE

## 2021-07-08 PROCEDURE — 4004F PT TOBACCO SCREEN RCVD TLK: CPT | Performed by: INTERNAL MEDICINE

## 2021-07-08 PROCEDURE — 80061 LIPID PANEL: CPT

## 2021-07-08 PROCEDURE — 1123F ACP DISCUSS/DSCN MKR DOCD: CPT | Performed by: INTERNAL MEDICINE

## 2021-07-08 PROCEDURE — G8427 DOCREV CUR MEDS BY ELIG CLIN: HCPCS | Performed by: INTERNAL MEDICINE

## 2021-07-08 RX ORDER — ATORVASTATIN CALCIUM 20 MG/1
20 TABLET, FILM COATED ORAL DAILY
Qty: 90 TABLET | Refills: 3 | Status: SHIPPED | OUTPATIENT
Start: 2021-07-08 | End: 2022-07-21 | Stop reason: SDUPTHER

## 2021-07-08 SDOH — ECONOMIC STABILITY: FOOD INSECURITY: WITHIN THE PAST 12 MONTHS, THE FOOD YOU BOUGHT JUST DIDN'T LAST AND YOU DIDN'T HAVE MONEY TO GET MORE.: NEVER TRUE

## 2021-07-08 SDOH — ECONOMIC STABILITY: FOOD INSECURITY: WITHIN THE PAST 12 MONTHS, YOU WORRIED THAT YOUR FOOD WOULD RUN OUT BEFORE YOU GOT MONEY TO BUY MORE.: NEVER TRUE

## 2021-07-08 ASSESSMENT — PATIENT HEALTH QUESTIONNAIRE - PHQ9
SUM OF ALL RESPONSES TO PHQ QUESTIONS 1-9: 0
2. FEELING DOWN, DEPRESSED OR HOPELESS: 0
SUM OF ALL RESPONSES TO PHQ QUESTIONS 1-9: 0
SUM OF ALL RESPONSES TO PHQ QUESTIONS 1-9: 0
1. LITTLE INTEREST OR PLEASURE IN DOING THINGS: 0
SUM OF ALL RESPONSES TO PHQ9 QUESTIONS 1 & 2: 0

## 2021-07-08 ASSESSMENT — SOCIAL DETERMINANTS OF HEALTH (SDOH): HOW HARD IS IT FOR YOU TO PAY FOR THE VERY BASICS LIKE FOOD, HOUSING, MEDICAL CARE, AND HEATING?: NOT HARD AT ALL

## 2021-07-08 ASSESSMENT — LIFESTYLE VARIABLES: HOW OFTEN DO YOU HAVE A DRINK CONTAINING ALCOHOL: 0

## 2021-07-08 NOTE — PROGRESS NOTES
Darien Joe  YOB: 1950    Date of Service:  7/8/2021    Chief Complaint:      Chief Complaint   Patient presents with    Medicare AWV    Hypertension    Hyperlipidemia       Assessment/Plan:  Rosibel Bran was seen today for medicare awv, hypertension and hyperlipidemia. Diagnoses and all orders for this visit:    Routine general medical examination at a health care facility  -     Full code    Mixed hyperlipidemia  -     atorvastatin (LIPITOR) 20 MG tablet; Take 1 tablet by mouth daily  -     Lipid Panel; Future  -     Comprehensive Metabolic Panel; Future    Essential hypertension  -     CBC Auto Differential; Future  -     Comprehensive Metabolic Panel; Future  Stable and continue on current medications. Chronic viral hepatitis B without delta agent and without coma (HCC)  Per GI      Return 6 month on BP and cholesterol. HPI:  Darien Joe is a 70 y.o. Treatment Adherence:   Medication compliance:  compliant most of the time  Diet compliance:  compliant most of the time  Weight trend: stable  Current exercise: aerobics 4 time(s) per week  Barriers: none     Hypertension:  Home blood pressure monitoring: No.  Stable on Lisinopril 10 mg qd.   She is adherent to a low sodium diet. Patient denies chest pain, shortness of breath, headache, lightheadedness, blurred vision, peripheral edema, palpitations, dry cough and fatigue.  Antihypertensive medication side effects: no medication side effects noted.  Use of agents associated with hypertension: none. Hyperlipidemia:  No new myalgias or GI upset on aorvastatin (Lipitor) 20 mg qd.      Lab Results   Component Value Date    LABA1C 5.9 07/08/2020    LABA1C 6.0 07/02/2019     Lab Results   Component Value Date     07/08/2020    K 4.7 07/08/2020     07/08/2020    CO2 26 07/08/2020    BUN 20 07/08/2020    CREATININE 0.8 07/08/2020    GLUCOSE 105 (H) 07/08/2020    CALCIUM 9.4 07/08/2020     Lab Results   Component Value Date    CHOL 140 07/08/2020    TRIG 152 07/08/2020    HDL 40 07/08/2020    LDLCALC 70 07/08/2020     Lab Results   Component Value Date    ALT 26 07/08/2020    AST 24 07/08/2020     Lab Results   Component Value Date    TSH 1.46 01/08/2013     Lab Results   Component Value Date    WBC 7.3 07/08/2020    HGB 13.9 07/08/2020    HCT 42.2 07/08/2020    MCV 88.6 07/08/2020     07/08/2020     No results found for: INR   No results found for: PSA   No results found for: [address]     Patient Active Problem List   Diagnosis    Mixed hyperlipidemia    Smoker    Patient overweight    Hyperglycemia    Essential hypertension    Hypertriglyceridemia    Chronic viral hepatitis B without delta agent and without coma (HCC)    History of colon polyps    Colon, diverticulosis    Hepatitis B carrier (HCC)    Varicose veins of bilateral lower extremities with other complications       No Known Allergies  Outpatient Medications Marked as Taking for the 7/8/21 encounter (Office Visit) with Doc Rubin MD   Medication Sig Dispense Refill    lisinopril (PRINIVIL;ZESTRIL) 10 MG tablet Take 1 tablet by mouth daily 90 tablet 3    atorvastatin (LIPITOR) 20 MG tablet Take 1 tablet by mouth daily 90 tablet 3    Cholecalciferol (VITAMIN D-3 PO) Take 1,000 Units by mouth daily           Review of Systems: 14 systems were negative except of what was stated on HPI    Nursing note and vitals reviewed. Vitals:    07/08/21 1008   BP: 118/74   Pulse: 85   Temp: 97.4 °F (36.3 °C)   SpO2: 99%   Weight: 169 lb (76.7 kg)   Height: 5' 4\" (1.626 m)     Wt Readings from Last 3 Encounters:   07/08/21 169 lb (76.7 kg)   02/08/21 169 lb (76.7 kg)   12/03/20 169 lb (76.7 kg)     BP Readings from Last 3 Encounters:   07/08/21 118/74   02/08/21 138/84   12/03/20 136/84     Body mass index is 29.01 kg/m². Constitutional: Patient appears well-developed and well-nourished. No distress. Head: Normocephalic and atraumatic. Neck: Normal range of motion. Neck supple. No thyroidmegaly. Cardiovascular: Normal rate, regular rhythm, normal heart sounds and intact distal pulses. Pulmonary/Chest: Effort normal and breath sounds normal. No stridor. No respiratory distress. No wheezes and no rales. Abdominal: Soft. Bowel sounds are normal. No distension and no mass. No tenderness. No rebound and no guarding. Musculoskeletal: No edema and no tenderness. Skin: No rash or erythema. Psychiatric: Normal mood and affect.  Behavior is normal.

## 2021-07-08 NOTE — PATIENT INSTRUCTIONS
Personalized Preventive Plan for Amadou Landeros - 7/8/2021  Medicare offers a range of preventive health benefits. Some of the tests and screenings are paid in full while other may be subject to a deductible, co-insurance, and/or copay. Some of these benefits include a comprehensive review of your medical history including lifestyle, illnesses that may run in your family, and various assessments and screenings as appropriate. After reviewing your medical record and screening and assessments performed today your provider may have ordered immunizations, labs, imaging, and/or referrals for you. A list of these orders (if applicable) as well as your Preventive Care list are included within your After Visit Summary for your review. Other Preventive Recommendations:    · A preventive eye exam performed by an eye specialist is recommended every 1-2 years to screen for glaucoma; cataracts, macular degeneration, and other eye disorders. · A preventive dental visit is recommended every 6 months. · Try to get at least 150 minutes of exercise per week or 10,000 steps per day on a pedometer . · Order or download the FREE \"Exercise & Physical Activity: Your Everyday Guide\" from [employer]. Call 9-632.983.6807 or search [employer] online. · You need 1551-3838 mg of calcium and 7461-2984 IU of vitamin D per day. It is possible to meet your calcium requirement with diet alone, but a vitamin D supplement is usually necessary to meet this goal.  · When exposed to the sun, use a sunscreen that protects against both UVA and UVB radiation with an SPF of 30 or greater. Reapply every 2 to 3 hours or after sweating, drying off with a towel, or swimming. · Always wear a seat belt when traveling in a car. Always wear a helmet when riding a bicycle or motorcycle.

## 2021-07-08 NOTE — PROGRESS NOTES
Medicare Annual Wellness Visit  Name: Madhavi Lopez Date: 2021   MRN: [id number] Sex: Female   Age: 70 y.o. Ethnicity: Non-/Non    : 1950 Race: Darby Curtis is here for Medicare AWV    Screenings for behavioral, psychosocial and functional/safety risks, and cognitive dysfunction are all negative except as indicated below. These results, as well as other patient data from the [address] form, are documented in Flowsheets linked to this Encounter. No Known Allergies    Prior to Visit Medications    Medication Sig Taking? Authorizing Provider   lisinopril (PRINIVIL;ZESTRIL) 10 MG tablet Take 1 tablet by mouth daily Yes Heidi Rubin MD   atorvastatin (LIPITOR) 20 MG tablet Take 1 tablet by mouth daily Yes Heidi Rubin MD   Cholecalciferol (VITAMIN D-3 PO) Take 1,000 Units by mouth daily Yes Historical Provider, MD   valACYclovir (VALTREX) 1 g tablet Take 1 tablet by mouth 3 times daily  Patient not taking: Reported on 2021  Jacinda Rubin MD   Cyanocobalamin (B-12) 1000 MCG TABS Take 1,000 mcg by mouth daily  Patient not taking: Reported on 2021  Historical Provider, MD       Past Medical History:   Diagnosis Date    Abnormal hepatitis serology     Reactive Hepatitis B Surface Ag, Hepatitis B Core total (13)    Chronic viral hepatitis B without delta agent and without coma ([facility] [address] 2017    Elevated rheumatoid factor     HTN (hypertension) 5/10/16    Hyperglycemia     Hyperlipidemia     ():  Hypertriglyceridemia, Low HDL    Overweight(278.02)     max weight 172 lbs (), goal = 143 lbs    Postmenopausal bone loss 5/15: Dexa    age 48: postmenopausal    Smoker age 15    Vitamin D insufficiency        Past Surgical History:   Procedure Laterality Date    COLONOSCOPY  2015    polyps    DENTAL SURGERY  ages [age]    upper teeth removed, 2 back lower teeth removed    [place] COLON CA SCRN NOT HI RSK IND N/A 2018    COLONOSCOPY performed by Martinez Martell MD at [address] Bilateral 1978       Family History   Problem Relation Age of Onset    High Blood Pressure Sister     Stroke Sister 48    High Blood Pressure Brother     Breast Cancer Mother 59         of Breast Cancer-Metastases to Bone    Cancer Mother         breast    High Cholesterol Sister     Heart Attack Paternal Grandmother 76         of MI age 76    Diabetes Paternal Cousin 19         age 27    Breast Cancer Maternal Aunt 61         of Breast Cancer    Breast Cancer Maternal Grandmother 80         age 80 of Breast Cancer       CareTeam (Including outside providers/suppliers regularly involved in providing care):   Patient Care Team:  Doc Rubin MD as PCP - General (Internal Medicine)  Doc Rubin MD as PCP - [address] [place], [place] [address]led Provider  Leo Mcmullen DO as Consulting Physician (Obstetrics & Gynecology)  Blanche Arango MD as Consulting Physician (Gastroenterology)    Wt Readings from Last 3 Encounters:   21 169 lb (76.7 kg)   21 169 lb (76.7 kg)   20 169 lb (76.7 kg)     Vitals:    21 1008   BP: 118/74   Pulse: 85   Temp: 97.4 °F (36.3 °C)   SpO2: 99%   Weight: 169 lb (76.7 kg)   Height: 5' 4\" (1.626 m)     Body mass index is 29.01 kg/m². Based upon direct observation of the patient, evaluation of cognition reveals recent and remote memory intact. Patient's complete Health Risk Assessment and screening values have been reviewed and are found in Flowsheets. The following problems were reviewed today and where indicated follow up appointments were made and/or referrals ordered.     Positive Risk Factor Screenings with Interventions:         Substance History:  Social History     Tobacco History     Smoking Status  Current Some Day Smoker Smoking Frequency  0.25 packs/day for 49 years (12.25 pk yrs) Smoking Tobacco Type  Cigarettes    Smokeless Tobacco Use  Never Used    Tobacco Comment  smoking 1-2 cigs/day. Quit Date (2/7/17)          Alcohol History     Alcohol Use Status  Yes Drinks/Week  1 Cans of beer per week Amount  1.0 standard drinks of alcohol/wk Comment  occasional          Drug Use     Drug Use Status  No          Sexual Activity     Sexually Active  Not Currently Partners  Male Birth Control/Protection  Post-menopausal               Alcohol Screening:       A score of 8 or more is associated with harmful or hazardous drinking. A score of 13 or more in women, and 15 or more in men, is likely to indicate alcohol dependence. Substance Abuse Interventions:  · Tobacco abuse:  patient agrees to think about his/her triggers for tobacco use, why he/she should quit, and learn more about the harmful effects of tobacco, 2-3 cig a day    General Health and ACP:  General  In general, how would you say your health is?: Very Good  In the past 7 days, have you experienced any of the following?  New or Increased Pain, New or Increased Fatigue, Loneliness, Social Isolation, Stress or Anger?: None of These  Do you get the social and emotional support that you need?: Yes  Do you have a Living Will?: (!) No  Advance Directives     Power of [address] [place] [address] Will ACP-Advance Directive ACP-Power of     Not on File Not on File Not on File Not on File      General Health Risk Interventions:  · No Living Will: Full Code    Health Habits/Nutrition:  Health Habits/Nutrition  Do you exercise for at least 20 minutes 2-3 times per week?: (!) No  Have you lost any weight without trying in the past 3 months?: No  Do you eat only one meal per day?: No  Have you seen the dentist within the past year?: (!) No  Body mass index: (!) 29.01  Health Habits/Nutrition Interventions:  · Inadequate physical activity:  patient agrees to exercise for at least 150 minutes/week  · Dental exam overdue:  patient encouraged to make appointment with his/her dentist    Hearing/Vision:  No exam data present  Hearing/Vision  Do you or your family notice any trouble with your hearing that hasn't been managed with hearing aids?: No  Do you have difficulty driving, watching TV, or doing any of your daily activities because of your eyesight?: No  Have you had an eye exam within the past year?: (!) No  Hearing/Vision Interventions:  · Vision concerns:  patient encouraged to make appointment with his/her eye specialist      Personalized Preventive Plan   Current Health Maintenance Status  Immunization History   Administered Date(s) Administered    Hepatitis A Adult (Havrix, Vaqta) 07/26/2019, 01/30/2020    Influenza Vaccine, unspecified formulation 11/24/2012, 10/08/2013, 11/08/2014, 10/05/2015    Influenza Virus Vaccine 11/24/2012, 10/08/2013, 11/08/2014, 10/05/2015, 10/15/2018    Influenza, High Dose (Fluzone 65 yrs and older) 11/01/2019, 10/10/2020    Pneumococcal Conjugate 13-valent ([id number]) 04/30/2015    Pneumococcal Polysaccharide ([id number]) 05/06/2016    Tdap (Boostrix, Adacel) 04/30/2015        Health Maintenance   Topic Date Due    Hepatitis B vaccine (1 of 3 - Risk 3-dose series) Never done    Shingles Vaccine (1 of 2) Never done    Breast cancer screen  12/09/2020    A1C test (Diabetic or Prediabetic)  07/08/2021    Potassium monitoring  07/08/2021    Creatinine monitoring  07/08/2021    Lipid screen  07/08/2021    Annual Wellness Visit (AWV)  07/09/2021    Flu vaccine (1) 09/01/2021    DTaP/Tdap/Td vaccine (2 - Td or Tdap) 04/30/2025    Colon cancer screen colonoscopy  08/16/2028    Hepatitis A vaccine  Completed    DEXA (modify frequency per FRAX score)  Completed    Pneumococcal 65+ years Vaccine  Completed    COVID-19 Vaccine  Completed    Hepatitis C screen  Completed    Hib vaccine  Aged Out    Meningococcal (ACWY) vaccine  Aged Out     Recommendations for [employer] Due: see orders and patient instructions/AVS.  .   Recommended screening schedule for the next 5-10 years is provided to the patient in written form: see Patient Humble Pardo was seen today for medicare awv.     Diagnoses and all orders for this visit:    Routine general medical examination at a health care facility  -     Full code

## 2021-09-16 ENCOUNTER — INITIAL CONSULT (OUTPATIENT)
Dept: GASTROENTEROLOGY | Age: 71
End: 2021-09-16
Payer: MEDICARE

## 2021-09-16 VITALS
BODY MASS INDEX: 28.51 KG/M2 | SYSTOLIC BLOOD PRESSURE: 122 MMHG | DIASTOLIC BLOOD PRESSURE: 70 MMHG | WEIGHT: 167 LBS | HEIGHT: 64 IN

## 2021-09-16 DIAGNOSIS — B18.1 CHRONIC VIRAL HEPATITIS B WITHOUT DELTA AGENT AND WITHOUT COMA (HCC): Primary | ICD-10-CM

## 2021-09-16 PROCEDURE — G8399 PT W/DXA RESULTS DOCUMENT: HCPCS | Performed by: INTERNAL MEDICINE

## 2021-09-16 PROCEDURE — 1090F PRES/ABSN URINE INCON ASSESS: CPT | Performed by: INTERNAL MEDICINE

## 2021-09-16 PROCEDURE — G8417 CALC BMI ABV UP PARAM F/U: HCPCS | Performed by: INTERNAL MEDICINE

## 2021-09-16 PROCEDURE — 4004F PT TOBACCO SCREEN RCVD TLK: CPT | Performed by: INTERNAL MEDICINE

## 2021-09-16 PROCEDURE — G8427 DOCREV CUR MEDS BY ELIG CLIN: HCPCS | Performed by: INTERNAL MEDICINE

## 2021-09-16 PROCEDURE — 3017F COLORECTAL CA SCREEN DOC REV: CPT | Performed by: INTERNAL MEDICINE

## 2021-09-16 PROCEDURE — 4040F PNEUMOC VAC/ADMIN/RCVD: CPT | Performed by: INTERNAL MEDICINE

## 2021-09-16 PROCEDURE — 1123F ACP DISCUSS/DSCN MKR DOCD: CPT | Performed by: INTERNAL MEDICINE

## 2021-09-16 PROCEDURE — 99214 OFFICE O/P EST MOD 30 MIN: CPT | Performed by: INTERNAL MEDICINE

## 2021-09-16 NOTE — PATIENT INSTRUCTIONS
Patient Education        Hepatitis B: Care Instructions  Overview     Hepatitis B is a disease caused by a virus that infects the liver. It spreads through infected blood, semen, and other body fluids during sexual contact. It can be passed from mother to baby during childbirth. It also can spread when people share needles to inject drugs or share things that may have blood on them. These include razors and toothbrushes. Needles used for tattoos, body piercing, or acupuncture can spread the disease if they are not cleaned the right way. After you get the virus, it may be months before you see symptoms. You may never notice them. You can give the disease to other people before and after you have symptoms. Hepatitis B can make you tired. It can cause a fever, nausea, vomiting, light-colored stools, and dark urine. Your skin or eyes may look yellow. This is called jaundice. Most people get better in several weeks, but it can take several months. For some people the virus stays in their bodies. If the virus stays in your body for a long time, it can cause serious liver disease. After you have had the virus and feel better, you will not get it again. Follow-up care is a key part of your treatment and safety. Be sure to make and go to all appointments, and call your doctor if you are having problems. It's also a good idea to know your test results and keep a list of the medicines you take. How can you care for yourself at home? · Reduce your activity to match your energy level. · Avoid alcohol for as long as your doctor tells you to. This may be months. Alcohol can make liver problems worse. · Make sure your doctor knows all the medicines you take. Some medicines, such as acetaminophen (Tylenol), can make liver problems worse. Do not take any new medicines unless your doctor says it is okay. · Be safe with medicines. If your doctor prescribes antiviral medicine, take it exactly as prescribed.  Call your doctor if you think you are having a problem with your medicine. · If you have nausea or vomiting, try to eat smaller meals and eat more often. · Drink plenty of fluids, enough so that your urine is light yellow or clear like water. If you have kidney, heart, or liver disease and have to limit fluids, talk with your doctor before you increase the amount of fluids you drink. · If you have itchy skin, keep cool, stay out of the sun, and wear cotton clothing. Talk to your doctor about medicines that can be used for itching. Read and follow the instructions on the label. To prevent spreading hepatitis B  · Tell the people you live with or have sex with about your illness as soon as possible. The CDC recommends that people in close contact with an infected person get the hepatitis B vaccine. · Do not donate blood or blood products, organs, semen, or eggs (ova). · Stop all sexual activity or use latex condoms. Do this until your doctor tells you that you can no longer give hepatitis B to others. Avoid anal contact with a sex partner while you are infected. · Do not share personal items that may have your blood on them. These include razors, toothbrushes, towels, and nail files. · Use lotions or ointments to prevent chapped or broken skin. These skin problems can expose others to your blood. · Tell your doctor, dentist, and anyone else who may come in contact with your blood about your illness. · If you are pregnant, tell the doctor who will deliver your baby about your illness. Be sure your baby gets medicine to prevent infection. This should start right after birth. · If you get blood on your clothing or other fabrics, clean them well. · Be sure to carefully get rid of sanitary napkins and tampons or other disposable items that have your blood on them. Place them in sealed plastic bags before you throw them away.   · Use a solution of 1 part bleach to 10 parts water to clean surfaces that have your blood or any other body fluid (such as semen or menstrual blood) on them. These surfaces include toilet seats, countertops, and floors. · If you have long-term hepatitis B, always use latex condoms during any sexual activity. You can infect others with the virus even if you do not have symptoms. When should you call for help? Call 911 anytime you think you may need emergency care. For example, call if:    · You passed out (lost consciousness). Call your doctor now or seek immediate medical care if:    · You have new or worse belly pain.     · You have a new or higher fever.     · You are dizzy or lightheaded, or you feel like you may faint.     · You have symptoms of dehydration, such as:  ? Dry eyes and a dry mouth. ? Passing only a little urine. ? Feeling thirstier than normal.     · You cannot keep down medicine or fluids.     · You have new or more blood in stools.     · You have new or worse vomiting or diarrhea. Watch closely for changes in your health, and be sure to contact your doctor if:    · You do not get better as expected. Where can you learn more? Go to [email or web address] org and sign in to your [employer] account. Enter 88 60 86 in the [employer] box to learn more about \"Hepatitis B: Care Instructions. \"     If you do not have an account, please click on the \"Sign Up Now\" link. Current as of: September 23, 2020               Content Version: 12.9  © 1961-5225 Healthwise, Incorporated. Care instructions adapted under license by [facility]. If you have questions about a medical condition or this instruction, always ask your healthcare professional. [address] any warranty or liability for your use of this information.

## 2021-09-16 NOTE — PROGRESS NOTES
Carolyn Guevara    [address] [facility] ,  [address]  Phone: [phone]       CHIEF COMPLAINT  Chief Complaint   Patient presents with    New Patient     Prev schussler patient        Katiana Angulo is a 70 y.o. female with chronic inactive Hepatitis B virus who returns for follow up. Patient previously followed with GI Dr. Juan Vizcaino. Denies abdominal pain, nausea, vomiting, fever, chills, unintentional weight loss, constipation, diarrhea, hematochezia or melenic stools. Labs from 7/8/2020 noted HBV DNA 3490 and ALT 26; HBcAb positive, HBsAb <3.5; HBsAg and HBeAg status not checked. Ultrasound right upper quadrant on 8/6/2020 noted stable benign hepatic cyst.  Otherwise normal liver. Date of last office visit and details: 7/8/20 Dr. Cynthia Antoine  Has chbv. Viral load 2300 with normal LFTs last year. Denies complaints. Last Encounter Reviewed: 7/18/2019  Dennis Velarde  Is here for follow up of chbv with normal lft's and viral load < 1000 last year.  Had F0 fibrosis by Melly Espinosa last year.  Denies any complaints.     Last Encounter Reviewed: 8/2/2018 Kim Velarde  Is here for second opinion for possible chronic hepatitis B.  HBsAb and HBcAb were positive in 2012.  LFTs and CBC are normal.  She is concerned about her  who has passed had it.  No family history of liver disease.  No current symptoms.  Denies etoh use.  She is due for colonoscopy for a h/o colon polyp as below.  Has 4 grown children.  Had a daughter who drowned at a young age.   Pertinent PMH, FH, SH is reviewed below.     Last EGD: none  Last Colonoscopy: 8/16/2018 normal. Repeat in 5 years (2023)   Colonoscopy 6/24/2015 -  5mm sessile serrated polyp         PAST MEDICAL HISTORY     Past Medical History:   Diagnosis Date    Abnormal hepatitis serology 1/13    Reactive Hepatitis B Surface Ag, Hepatitis B Core total (1/4/13)    Chronic viral hepatitis B without delta agent and without coma ([facility] [address] 2017    Elevated rheumatoid factor     HTN (hypertension) 5/10/16    Hyperglycemia     Hyperlipidemia     (): Hypertriglyceridemia, Low HDL    Overweight(278.02)     max weight 172 lbs (), goal = 143 lbs    Postmenopausal bone loss 5/15: Dexa    age 48: postmenopausal    Smoker age 12    Vitamin D insufficiency      FAMILY HISTORY     Family History   Problem Relation Age of Onset    High Blood Pressure Sister     Stroke Sister 48    High Blood Pressure Brother     Breast Cancer Mother 59         of Breast Cancer-Metastases to Bone    Cancer Mother         breast    High Cholesterol Sister     Heart Attack Paternal Grandmother 76         of MI age 76    Diabetes Paternal Cousin 19         age 27    Breast Cancer Maternal Aunt 61         of Breast Cancer    Breast Cancer Maternal Grandmother 80         age 80 of Breast Cancer     SOCIAL HISTORY     Social History     Socioeconomic History    Marital status:      Spouse name: Ramona Lira Number of children: 4    Years of education: 11th grade    Highest education level: Not on file   Occupational History    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 Quit Date (17)   Vaping Use    Vaping Use: Never used   Substance and Sexual Activity    Alcohol use:  Yes     Alcohol/week: 1.0 standard drinks     Types: 1 Cans of beer per week     Comment: occasional    Drug use: No    Sexual activity: Not Currently     Partners: Male     Birth control/protection: Post-menopausal   Other Topics Concern    Not on file   Social History Narrative    Not on file     Social Determinants of Health     Financial Resource Strain: Low Risk     formulation 11/24/2012, 10/08/2013, 11/08/2014, 10/05/2015    Influenza Virus Vaccine 11/24/2012, 10/08/2013, 11/08/2014, 10/05/2015, 10/15/2018    Influenza, High Dose (Fluzone 65 yrs and older) 11/01/2019, 10/10/2020    Pneumococcal Conjugate 13-valent ([id number]) 04/30/2015    Pneumococcal Polysaccharide ([id number]) 05/06/2016    Tdap (Boostrix, Adacel) 04/30/2015       REVIEW OF SYSTEMS   See HPI for further details and pertinent postiives. Negative for the following:  Constitutional: Negative for weight change. Negative for appetite change and fatigue. HENT: Negative for nosebleeds, sore throat, mouth sores, and voice change. Respiratory: Negative for cough, choking and chest tightness. Cardiovascular: Negative for chest pain   Gastrointestinal: See HPI  Musculoskeletal: Negative for arthralgias. Skin: Negative for pallor. Neurological: Negative for weakness and light-headedness. Hematological: Negative for adenopathy. Does not bruise/bleed easily. Psychiatric/Behavioral: Negative for suicidal ideas. PHYSICAL EXAM   VITAL SIGNS: /70   Ht 5' 4\" (1.626 m)   Wt 167 lb (75.8 kg)   LMP 01/01/2000   BMI 28.67 kg/m²   Wt Readings from Last 3 Encounters:   09/16/21 167 lb (75.8 kg)   07/08/21 169 lb (76.7 kg)   02/08/21 169 lb (76.7 kg)     Constitutional: Well developed, Well nourished, No acute distress, Non-toxic appearance. HENT: Normocephalic, Atraumatic, Bilateral external ears normal, Oropharynx moist, No oral exudates, Nose normal.   Eyes: Conjunctiva normal, No discharge. Neck: Normal range of motion, No tenderness, Supple  Cardiovascular: Normal heart rate, Normal rhythm, No murmurs, No rubs, No gallops. Thorax & Lungs: Normal breath sounds, No respiratory distress, No wheezing, No chest tenderness  Abdomen:  normal bowel sounds, soft, non tender, non distended,  no hernias   Rectal:  Deferred. Skin: Warm, Dry, No erythema, No rash. No bruising.   No spider hemangiomas. Back: No tenderness, No CVA tenderness. Lower Extremities: Intact distal pulses, No edema, No tenderness, No cyanosis, No clubbing. Neurologic: Alert & oriented x 3, Normal motor function, Normal sensory function, No focal deficits noted. No results found. FINAL IMPRESSION   Suma Sood was seen today for new patient. Diagnoses and all orders for this visit:    Chronic inactive viral hepatitis B without delta agent and without coma (HCC) - HBV DNA < 20,000 and ALT normal   -     Hepatitis Be antigen  -     Hepatitis B Surface Antigen  -     Hepatic Function Panel; Standing  -     US GALLBLADDER RUQ; Standing for [address] screening   -     Hepatitis B DNA, ultraquantitative, PCR; Future        Orders Placed This Encounter   Procedures    US GALLBLADDER RUQ     This procedure can be scheduled via [facility]. Access your [employer] account by visiting [email or web address]. Standing Status:   Standing     Number of Occurrences:   6     Standing Expiration Date:   3/16/2022     Order Specific Question:   Reason for exam:     Answer:   chronic inactive hepatitis. Rule out hepatoma/HCC    Hepatitis Be antigen    Hepatitis B Surface Antigen    Hepatic Function Panel     Standing Status:   Standing     Number of Occurrences:   6     Standing Expiration Date:   3/16/2022    Hepatitis B DNA, ultraquantitative, PCR     Standing Status:   Future     Standing Expiration Date:   3/16/2022       ORDERED FUTURE/PENDING TESTS     Lab Frequency Next Occurrence       FOLLOWUP   Return in about 6 months (around 3/16/2022).           Nani Casey MD 9/15/21 10:26 PM EDT    CC:  Wolf Marques MD

## 2021-10-01 ENCOUNTER — HOSPITAL ENCOUNTER (OUTPATIENT)
Age: 71
Discharge: HOME | End: 2021-10-01
Payer: MEDICARE

## 2021-10-01 ENCOUNTER — HOSPITAL ENCOUNTER (OUTPATIENT)
Dept: ULTRASOUND IMAGING | Age: 71
Discharge: HOME | End: 2021-10-01
Payer: MEDICARE

## 2021-10-01 DIAGNOSIS — B18.1 CHRONIC VIRAL HEPATITIS B WITHOUT DELTA AGENT AND WITHOUT COMA (HCC): ICD-10-CM

## 2021-10-01 LAB
ALBUMIN SERPL-MCNC: 4.2 G/DL (ref 3.4–5)
ALP BLD-CCNC: 82 U/L (ref 40–129)
ALT SERPL-CCNC: 22 U/L (ref 10–40)
AST SERPL-CCNC: 22 U/L (ref 15–37)
BILIRUB SERPL-MCNC: 0.3 MG/DL (ref 0–1)
BILIRUBIN DIRECT: <0.2 MG/DL (ref 0–0.3)
BILIRUBIN, INDIRECT: NORMAL MG/DL (ref 0–1)
TOTAL PROTEIN: 7.2 G/DL (ref 6.4–8.2)

## 2021-10-01 PROCEDURE — 80076 HEPATIC FUNCTION PANEL: CPT

## 2021-10-01 PROCEDURE — 36415 COLL VENOUS BLD VENIPUNCTURE: CPT

## 2021-10-01 PROCEDURE — 76705 ECHO EXAM OF ABDOMEN: CPT

## 2021-12-20 ENCOUNTER — OFFICE VISIT (OUTPATIENT)
Dept: INTERNAL MEDICINE CLINIC | Age: 71
End: 2021-12-20
Payer: MEDICARE

## 2021-12-20 VITALS
WEIGHT: 168 LBS | HEIGHT: 64 IN | SYSTOLIC BLOOD PRESSURE: 138 MMHG | DIASTOLIC BLOOD PRESSURE: 82 MMHG | HEART RATE: 74 BPM | BODY MASS INDEX: 28.68 KG/M2 | OXYGEN SATURATION: 98 %

## 2021-12-20 DIAGNOSIS — Z23 NEED FOR INFLUENZA VACCINATION: ICD-10-CM

## 2021-12-20 DIAGNOSIS — E78.2 MIXED HYPERLIPIDEMIA: ICD-10-CM

## 2021-12-20 DIAGNOSIS — I10 ESSENTIAL HYPERTENSION: Primary | ICD-10-CM

## 2021-12-20 PROCEDURE — G8484 FLU IMMUNIZE NO ADMIN: HCPCS | Performed by: INTERNAL MEDICINE

## 2021-12-20 PROCEDURE — 4004F PT TOBACCO SCREEN RCVD TLK: CPT | Performed by: INTERNAL MEDICINE

## 2021-12-20 PROCEDURE — 1090F PRES/ABSN URINE INCON ASSESS: CPT | Performed by: INTERNAL MEDICINE

## 2021-12-20 PROCEDURE — G8417 CALC BMI ABV UP PARAM F/U: HCPCS | Performed by: INTERNAL MEDICINE

## 2021-12-20 PROCEDURE — G8399 PT W/DXA RESULTS DOCUMENT: HCPCS | Performed by: INTERNAL MEDICINE

## 2021-12-20 PROCEDURE — 1123F ACP DISCUSS/DSCN MKR DOCD: CPT | Performed by: INTERNAL MEDICINE

## 2021-12-20 PROCEDURE — G8427 DOCREV CUR MEDS BY ELIG CLIN: HCPCS | Performed by: INTERNAL MEDICINE

## 2021-12-20 PROCEDURE — 3017F COLORECTAL CA SCREEN DOC REV: CPT | Performed by: INTERNAL MEDICINE

## 2021-12-20 PROCEDURE — 99213 OFFICE O/P EST LOW 20 MIN: CPT | Performed by: INTERNAL MEDICINE

## 2021-12-20 PROCEDURE — 90694 VACC AIIV4 NO PRSRV 0.5ML IM: CPT | Performed by: INTERNAL MEDICINE

## 2021-12-20 PROCEDURE — G0008 ADMIN INFLUENZA VIRUS VAC: HCPCS | Performed by: INTERNAL MEDICINE

## 2021-12-20 PROCEDURE — 4040F PNEUMOC VAC/ADMIN/RCVD: CPT | Performed by: INTERNAL MEDICINE

## 2021-12-20 RX ORDER — LISINOPRIL 10 MG/1
10 TABLET ORAL DAILY
Qty: 90 TABLET | Refills: 3 | Status: SHIPPED | OUTPATIENT
Start: 2021-12-20

## 2021-12-20 NOTE — PROGRESS NOTES
Lukasz Cook  YOB: 1950    Date of Service:  12/20/2021    Chief Complaint:      Chief Complaint   Patient presents with    Hypertension    Hyperlipidemia       Assessment/Plan:  Yusef Valerio was seen today for hypertension and hyperlipidemia. Diagnoses and all orders for this visit:    Essential hypertension  -     lisinopril (PRINIVIL;ZESTRIL) 10 MG tablet; Take 1 tablet by mouth daily    Mixed hyperlipidemia    Need for influenza vaccination  -     INFLUENZA, QUADV, ADJUVANTED, 65 YRS =, IM, PF, PREFILL SYR, 0.5ML (FLUAD)    Stable and continue on current medications. Return 7/8 or after Fasting Medicare Wellness and f/u. HPI:  Lukasz Cook is a 70 y.o. Treatment Adherence:   Medication compliance:  compliant most of the time  Diet compliance:  compliant most of the time  Weight trend: stable  Current exercise: aerobics 4 time(s) per week  Barriers: none     Hypertension:  Home blood pressure monitoring: No.  Stable on Lisinopril 10 mg qd.   She is adherent to a low sodium diet. Patient denies chest pain, shortness of breath, headache, lightheadedness, blurred vision, peripheral edema, palpitations, dry cough and fatigue.  Antihypertensive medication side effects: no medication side effects noted.  Use of agents associated with hypertension: none. Hyperlipidemia:  No new myalgias or GI upset on aorvastatin (Lipitor) 20 mg qd.      Lab Results   Component Value Date    LABA1C 5.9 07/08/2020    LABA1C 6.0 07/02/2019     Lab Results   Component Value Date     (H) 07/08/2021    K 4.6 07/08/2021     07/08/2021    CO2 28 07/08/2021    BUN 12 07/08/2021    CREATININE 0.8 07/08/2021    GLUCOSE 104 (H) 07/08/2021    CALCIUM 9.6 07/08/2021     Lab Results   Component Value Date    CHOL 136 07/08/2021    TRIG 126 07/08/2021    HDL 39 07/08/2021    LDLCALC 72 07/08/2021     Lab Results   Component Value Date    ALT 22 10/01/2021    AST 22 10/01/2021     Lab Results   Component Value Date    TSH 1.46 01/08/2013     Lab Results   Component Value Date    WBC 9.8 07/08/2021    HGB 14.1 07/08/2021    HCT 42.0 07/08/2021    MCV 86.8 07/08/2021     07/08/2021     No results found for: INR   No results found for: PSA   No results found for: [address]     Patient Active Problem List   Diagnosis    Mixed hyperlipidemia    Smoker    Patient overweight    Hyperglycemia    Essential hypertension    Hypertriglyceridemia    [facility] delta agent and [facility]    History of colon polyps    Colon, diverticulosis    Hepatitis B carrier ([facility]    Varicose veins of bilateral lower extremities with other complications       No Known Allergies  Outpatient Medications Marked as Taking for the 12/20/21 encounter (Office Visit) with Jay Rubin MD   Medication Sig Dispense Refill    atorvastatin (LIPITOR) 20 MG tablet Take 1 tablet by mouth daily 90 tablet 3    lisinopril (PRINIVIL;ZESTRIL) 10 MG tablet Take 1 tablet by mouth daily 90 tablet 3    Cholecalciferol (VITAMIN D-3 PO) Take 1,000 Units by mouth daily           Review of Systems: 14 systems were negative except of what was stated on HPI    Nursing note and vitals reviewed. Vitals:    12/20/21 0947   BP: 138/82   Pulse: 74   SpO2: 98%   Weight: 168 lb (76.2 kg)   Height: 5' 4\" (1.626 m)     Wt Readings from Last 3 Encounters:   12/20/21 168 lb (76.2 kg)   09/16/21 167 lb (75.8 kg)   07/08/21 169 lb (76.7 kg)     BP Readings from Last 3 Encounters:   12/20/21 138/82   09/16/21 122/70   07/08/21 118/74     Body mass index is 28.84 kg/m². Constitutional: Patient appears well-developed and well-nourished. No distress. Head: Normocephalic and atraumatic. Neck: Normal range of motion. Neck supple. No thyroidmegaly. Cardiovascular: Normal rate, regular rhythm, normal heart sounds and intact distal pulses. Pulmonary/Chest: Effort normal and breath sounds normal. No stridor. No respiratory distress.  No wheezes and no rales. Abdominal: Soft. Bowel sounds are normal. No distension and no mass. No tenderness. No rebound and no guarding. Musculoskeletal: No edema and no tenderness. Skin: No rash or erythema.

## 2022-01-06 ENCOUNTER — HOSPITAL ENCOUNTER (OUTPATIENT)
Dept: WOMENS IMAGING | Age: 72
Discharge: HOME | End: 2022-01-06
Payer: MEDICARE

## 2022-01-06 VITALS — HEIGHT: 64 IN | WEIGHT: 165 LBS | BODY MASS INDEX: 28.17 KG/M2

## 2022-01-06 DIAGNOSIS — Z12.31 ENCOUNTER FOR SCREENING MAMMOGRAM FOR BREAST CANCER: ICD-10-CM

## 2022-01-06 PROCEDURE — 77067 SCR MAMMO BI INCL CAD: CPT

## 2022-05-03 DIAGNOSIS — B18.1 CHRONIC HEPATITIS B (HCC): Primary | ICD-10-CM

## 2022-05-20 ENCOUNTER — HOSPITAL ENCOUNTER (OUTPATIENT)
Dept: ULTRASOUND IMAGING | Age: 72
Discharge: HOME | End: 2022-05-20
Payer: MEDICARE

## 2022-05-20 ENCOUNTER — HOSPITAL ENCOUNTER (OUTPATIENT)
Age: 72
Discharge: HOME | End: 2022-05-20
Payer: MEDICARE

## 2022-05-20 DIAGNOSIS — B18.1 CHRONIC HEPATITIS B (HCC): ICD-10-CM

## 2022-05-20 LAB
ALBUMIN SERPL-MCNC: 4.3 G/DL (ref 3.4–5)
ALP BLD-CCNC: 93 U/L (ref 40–129)
ALT SERPL-CCNC: 22 U/L (ref 10–40)
AST SERPL-CCNC: 21 U/L (ref 15–37)
BILIRUB SERPL-MCNC: 0.4 MG/DL (ref 0–1)
BILIRUBIN DIRECT: <0.2 MG/DL (ref 0–0.3)
BILIRUBIN, INDIRECT: NORMAL MG/DL (ref 0–1)
TOTAL PROTEIN: 7.8 G/DL (ref 6.4–8.2)

## 2022-05-20 PROCEDURE — 87517 HEPATITIS B DNA QUANT: CPT

## 2022-05-20 PROCEDURE — 76705 ECHO EXAM OF ABDOMEN: CPT

## 2022-05-20 PROCEDURE — 36415 COLL VENOUS BLD VENIPUNCTURE: CPT

## 2022-05-20 PROCEDURE — 87350 HEPATITIS BE AG IA: CPT

## 2022-05-20 PROCEDURE — 80076 HEPATIC FUNCTION PANEL: CPT

## 2022-05-22 LAB — HEPATITIS BE ANTIGEN: NEGATIVE

## 2022-05-23 LAB
HBV QNT LOG, IU/ML: 3.7 LOG IU/ML
HBV QNT, IU/ML: ABNORMAL IU/ML
INTERPRETATION: DETECTED

## 2022-05-30 DIAGNOSIS — B18.1 CHRONIC HEPATITIS B WITHOUT DELTA AGENT WITHOUT CIRRHOSIS (HCC): Primary | ICD-10-CM

## 2022-07-21 ENCOUNTER — OFFICE VISIT (OUTPATIENT)
Dept: INTERNAL MEDICINE CLINIC | Age: 72
End: 2022-07-21
Payer: MEDICARE

## 2022-07-21 VITALS
HEART RATE: 80 BPM | HEIGHT: 64 IN | WEIGHT: 160 LBS | DIASTOLIC BLOOD PRESSURE: 78 MMHG | BODY MASS INDEX: 27.31 KG/M2 | OXYGEN SATURATION: 96 % | SYSTOLIC BLOOD PRESSURE: 122 MMHG

## 2022-07-21 DIAGNOSIS — I10 ESSENTIAL HYPERTENSION: ICD-10-CM

## 2022-07-21 DIAGNOSIS — B18.1 CHRONIC VIRAL HEPATITIS B WITHOUT DELTA AGENT AND WITHOUT COMA (HCC): ICD-10-CM

## 2022-07-21 DIAGNOSIS — R73.9 HYPERGLYCEMIA: ICD-10-CM

## 2022-07-21 DIAGNOSIS — Z00.00 MEDICARE ANNUAL WELLNESS VISIT, SUBSEQUENT: Primary | ICD-10-CM

## 2022-07-21 DIAGNOSIS — E78.2 MIXED HYPERLIPIDEMIA: ICD-10-CM

## 2022-07-21 PROCEDURE — G0439 PPPS, SUBSEQ VISIT: HCPCS | Performed by: INTERNAL MEDICINE

## 2022-07-21 PROCEDURE — G8399 PT W/DXA RESULTS DOCUMENT: HCPCS | Performed by: INTERNAL MEDICINE

## 2022-07-21 PROCEDURE — 1090F PRES/ABSN URINE INCON ASSESS: CPT | Performed by: INTERNAL MEDICINE

## 2022-07-21 PROCEDURE — G8427 DOCREV CUR MEDS BY ELIG CLIN: HCPCS | Performed by: INTERNAL MEDICINE

## 2022-07-21 PROCEDURE — 99214 OFFICE O/P EST MOD 30 MIN: CPT | Performed by: INTERNAL MEDICINE

## 2022-07-21 PROCEDURE — 3017F COLORECTAL CA SCREEN DOC REV: CPT | Performed by: INTERNAL MEDICINE

## 2022-07-21 PROCEDURE — G8417 CALC BMI ABV UP PARAM F/U: HCPCS | Performed by: INTERNAL MEDICINE

## 2022-07-21 PROCEDURE — 1123F ACP DISCUSS/DSCN MKR DOCD: CPT | Performed by: INTERNAL MEDICINE

## 2022-07-21 PROCEDURE — 36415 COLL VENOUS BLD VENIPUNCTURE: CPT | Performed by: INTERNAL MEDICINE

## 2022-07-21 PROCEDURE — 4004F PT TOBACCO SCREEN RCVD TLK: CPT | Performed by: INTERNAL MEDICINE

## 2022-07-21 RX ORDER — ATORVASTATIN CALCIUM 20 MG/1
20 TABLET, FILM COATED ORAL DAILY
Qty: 90 TABLET | Refills: 3 | Status: SHIPPED | OUTPATIENT
Start: 2022-07-21

## 2022-07-21 SDOH — ECONOMIC STABILITY: FOOD INSECURITY: WITHIN THE PAST 12 MONTHS, THE FOOD YOU BOUGHT JUST DIDN'T LAST AND YOU DIDN'T HAVE MONEY TO GET MORE.: NEVER TRUE

## 2022-07-21 SDOH — ECONOMIC STABILITY: FOOD INSECURITY: WITHIN THE PAST 12 MONTHS, YOU WORRIED THAT YOUR FOOD WOULD RUN OUT BEFORE YOU GOT MONEY TO BUY MORE.: NEVER TRUE

## 2022-07-21 ASSESSMENT — PATIENT HEALTH QUESTIONNAIRE - PHQ9
1. LITTLE INTEREST OR PLEASURE IN DOING THINGS: 0
SUM OF ALL RESPONSES TO PHQ QUESTIONS 1-9: 0
SUM OF ALL RESPONSES TO PHQ QUESTIONS 1-9: 0
2. FEELING DOWN, DEPRESSED OR HOPELESS: 0
SUM OF ALL RESPONSES TO PHQ QUESTIONS 1-9: 0
SUM OF ALL RESPONSES TO PHQ QUESTIONS 1-9: 0
SUM OF ALL RESPONSES TO PHQ9 QUESTIONS 1 & 2: 0

## 2022-07-21 ASSESSMENT — LIFESTYLE VARIABLES
HOW MANY STANDARD DRINKS CONTAINING ALCOHOL DO YOU HAVE ON A TYPICAL DAY: 1 OR 2
HOW OFTEN DO YOU HAVE A DRINK CONTAINING ALCOHOL: 2-3 TIMES A WEEK

## 2022-07-21 ASSESSMENT — SOCIAL DETERMINANTS OF HEALTH (SDOH): HOW HARD IS IT FOR YOU TO PAY FOR THE VERY BASICS LIKE FOOD, HOUSING, MEDICAL CARE, AND HEATING?: NOT HARD AT ALL

## 2022-07-21 NOTE — PATIENT INSTRUCTIONS
Personalized Preventive Plan for Kim Lazaro - 7/21/2022  Medicare offers a range of preventive health benefits. Some of the tests and screenings are paid in full while other may be subject to a deductible, co-insurance, and/or copay. Some of these benefits include a comprehensive review of your medical history including lifestyle, illnesses that may run in your family, and various assessments and screenings as appropriate. After reviewing your medical record and screening and assessments performed today your provider may have ordered immunizations, labs, imaging, and/or referrals for you. A list of these orders (if applicable) as well as your Preventive Care list are included within your After Visit Summary for your review. Other Preventive Recommendations:    A preventive eye exam performed by an eye specialist is recommended every 1-2 years to screen for glaucoma; cataracts, macular degeneration, and other eye disorders. A preventive dental visit is recommended every 6 months. Try to get at least 150 minutes of exercise per week or 10,000 steps per day on a pedometer . Order or download the FREE \"Exercise & Physical Activity: Your Everyday Guide\" from [employer]. Call 3-422.776.1796 or search [employer] online. You need 7038-1381 mg of calcium and 4773-9966 IU of vitamin D per day. It is possible to meet your calcium requirement with diet alone, but a vitamin D supplement is usually necessary to meet this goal.  When exposed to the sun, use a sunscreen that protects against both UVA and UVB radiation with an SPF of 30 or greater. Reapply every 2 to 3 hours or after sweating, drying off with a towel, or swimming. Always wear a seat belt when traveling in a car. Always wear a helmet when riding a bicycle or motorcycle.

## 2022-07-21 NOTE — PROGRESS NOTES
Justice Sebastian  YOB: 1950    Date of Service:  7/21/2022    Chief Complaint:      Chief Complaint   Patient presents with    Medicare AWV    Hypertension    Hyperlipidemia       Assessment/Plan:  Luiz Riedel was seen today for medicare awv, hypertension and hyperlipidemia. Diagnoses and all orders for this visit:    Medicare annual wellness visit, subsequent  -     Full code    Mixed hyperlipidemia  -     Lipid Panel  -     atorvastatin (LIPITOR) 20 MG tablet; Take 1 tablet by mouth in the morning. Essential hypertension  -     Comprehensive Metabolic Panel  -     CBC with Auto Differential    Hyperglycemia  -     Hemoglobin A1C    Chronic viral hepatitis B without delta agent and without coma (HCC)  Stable per Dr. Chanell Solis    Return 6 months on BP, cholesterol. HPI:  Justice Sebastian is a 67 y.o. Treatment Adherence:  Medication compliance:  compliant most of the time  Diet compliance:  compliant most of the time  Weight trend: stable  Current exercise: aerobics 4 time(s) per week  Barriers: none     Hypertension:  Home blood pressure monitoring: No.  Stable on Lisinopril 10 mg qd. She is adherent to a low sodium diet. Patient denies chest pain, shortness of breath, headache, lightheadedness, blurred vision, peripheral edema, palpitations, dry cough and fatigue. Antihypertensive medication side effects: no medication side effects noted. Use of agents associated with hypertension: none. Hyperlipidemia:  No new myalgias or GI upset on aorvastatin (Lipitor) 20 mg qd.      Lab Results   Component Value Date    LABA1C 5.9 07/08/2020    LABA1C 6.0 07/02/2019     Lab Results   Component Value Date     (H) 07/08/2021    K 4.6 07/08/2021     07/08/2021    CO2 28 07/08/2021    BUN 12 07/08/2021    CREATININE 0.8 07/08/2021    GLUCOSE 104 (H) 07/08/2021    CALCIUM 9.6 07/08/2021     Lab Results   Component Value Date/Time    CHOL 136 07/08/2021 10:50 AM    TRIG 126 07/08/2021 10:50 AM    HDL 39 07/08/2021 10:50 AM    LDLCALC 72 07/08/2021 10:50 AM     Lab Results   Component Value Date    ALT 22 05/20/2022    AST 21 05/20/2022     Lab Results   Component Value Date    TSH 1.46 01/08/2013     Lab Results   Component Value Date    WBC 9.8 07/08/2021    HGB 14.1 07/08/2021    HCT 42.0 07/08/2021    MCV 86.8 07/08/2021     07/08/2021     No results found for: INR   No results found for: PSA   No results found for: [facility]     Patient Active Problem List   Diagnosis    Mixed hyperlipidemia    Smoker    Patient overweight    Hyperglycemia    Essential hypertension    Hypertriglyceridemia    Chronic viral hepatitis B without delta agent and without coma (HCC)    History of colon polyps    Colon, diverticulosis    Hepatitis B carrier (HCC)    Varicose veins of bilateral lower extremities with other complications       No Known Allergies  Outpatient Medications Marked as Taking for the 7/21/22 encounter (Office Visit) with Agustín Singh MD   Medication Sig Dispense Refill    lisinopril (PRINIVIL;ZESTRIL) 10 MG tablet Take 1 tablet by mouth daily 90 tablet 3    atorvastatin (LIPITOR) 20 MG tablet Take 1 tablet by mouth daily 90 tablet 3    Cholecalciferol (VITAMIN D-3 PO) Take 1,000 Units by mouth daily           Review of Systems: 14 systems were negative except of what was stated on HPI    Nursing note and vitals reviewed. Vitals:    07/21/22 0954   BP: 122/78   Pulse: 80   SpO2: 96%   Weight: 160 lb (72.6 kg)   Height: 5' 4\" (1.626 m)     Wt Readings from Last 3 Encounters:   07/21/22 160 lb (72.6 kg)   01/06/22 165 lb (74.8 kg)   12/20/21 168 lb (76.2 kg)     BP Readings from Last 3 Encounters:   07/21/22 122/78   12/20/21 138/82   09/16/21 122/70     Body mass index is 27.46 kg/m². Constitutional: Patient appears well-developed and well-nourished. No distress. Head: Normocephalic and atraumatic. Neck: Normal range of motion. Neck supple. No thyroidmegaly.    Cardiovascular: Normal rate, regular rhythm, normal heart sounds and intact distal pulses. Pulmonary/Chest: Effort normal and breath sounds normal. No stridor. No respiratory distress. No wheezes and no rales. Abdominal: Soft. Bowel sounds are normal. No distension and no mass. No tenderness. No rebound and no guarding. Musculoskeletal: No edema and no tenderness. Skin: No rash or erythema.

## 2022-07-21 NOTE — PROGRESS NOTES
Medicare Annual Wellness Visit    Salas Britton is here for Medicare AWV, Hypertension, and Hyperlipidemia    Assessment & Plan   Medicare annual wellness visit, subsequent  -     Full code    Recommendations for Preventive Services Due: see orders and patient instructions/AVS.  Recommended screening schedule for the next 5-10 years is provided to the patient in written form: see Patient Instructions/AVS.     Return for Medicare Annual Wellness Visit in 1 year. Subjective       Patient's complete Health Risk Assessment and screening values have been reviewed and are found in Flowsheets. The following problems were reviewed today and where indicated follow up appointments were made and/or referrals ordered.     Positive Risk Factor Screenings with Interventions:       Tobacco Use:  Tobacco Use: High Risk    Smoking Tobacco Use: Some Days    Smokeless Tobacco Use: Never     E-cigarette/Vaping       Questions Responses    E-cigarette/Vaping Use Never User    Start Date     Passive Exposure     Quit Date     Counseling Given     Comments           Substance Use - Tobacco Interventions:  patient agrees to think about his/her triggers for tobacco use, why he/she should quit, and learn more about the harmful effects of tobacco         General Health and ACP:  General  In general, how would you say your health is?: Very Good  In the past 7 days, have you experienced any of the following: New or Increased Pain, New or Increased Fatigue, Loneliness, Social Isolation, Stress or Anger?: No  Do you get the social and emotional support that you need?: Yes  Do you have a Living Will?: (!) No (needs to make one)    Advance Directives       Power of  Living Will ACP-Advance Directive ACP-Power of     Not on File Not on File Not on File Not on File          General Health Risk Interventions:  Full Code    Health Habits/Nutrition:  Physical Activity: Inactive    Days of Exercise per Week: 0 days    Minutes of Exercise per Session: 0 min     Have you lost any weight without trying in the past 3 months?: No  Body mass index: (!) 27.46  Have you seen the dentist within the past year?: (!) No (needs to schedule)  Health Habits/Nutrition Interventions:  Dental exam overdue:  patient encouraged to make appointment with his/her dentist  Patient encourage to walk 30 mins 5 days a week    Hearing/Vision:  Do you or your family notice any trouble with your hearing that hasn't been managed with hearing aids?: No  Do you have difficulty driving, watching TV, or doing any of your daily activities because of your eyesight?: No  Have you had an eye exam within the past year?: (!) No (needs to schedule)  No results found. Hearing/Vision Interventions:  Vision concerns:  patient encouraged to make appointment with his/her eye specialist    Safety:  Do you have working smoke detectors?: Yes  Do you have any tripping hazards - loose or unsecured carpets or rugs?: No  Do you have any tripping hazards - clutter in doorways, halls, or stairs?: No  Do you have either shower bars, grab bars, non-slip mats or non-slip surfaces in your shower or bathtub?: (!) No (patient will address)  Do all of your stairways have a railing or banister?: Yes  Do you always fasten your seatbelt when you are in a car?: Yes  Safety Interventions:             Objective   Vitals:    07/21/22 0954   BP: 122/78   Pulse: 80   SpO2: 96%   Weight: 160 lb (72.6 kg)   Height: 5' 4\" (1.626 m)      Body mass index is 27.46 kg/m². No Known Allergies  Prior to Visit Medications    Medication Sig Taking?  Authorizing Provider   lisinopril (PRINIVIL;ZESTRIL) 10 MG tablet Take 1 tablet by mouth daily Yes Heidi Rubin MD   atorvastatin (LIPITOR) 20 MG tablet Take 1 tablet by mouth daily Yes Heidi Rubin MD   Cholecalciferol (VITAMIN D-3 PO) Take 1,000 Units by mouth daily Yes Historical Provider, MD Pike (Including outside providers/suppliers regularly involved in providing care):   Patient Care Team:  Jazzy Morales MD as PCP - General (Internal Medicine)  Jazzy Morales MD as PCP - [facility] Empaneled Provider  Lilli Moran DO as Consulting Physician (Obstetrics & Gynecology)  Cecy Ugarte MD as Consulting Physician (Gastroenterology)     Reviewed and updated this visit:  Tobacco  Allergies  Meds  Problems  Med Hx  Surg Hx  Soc Hx  Fam Hx

## 2022-07-22 LAB
A/G RATIO: 1.7 (ref 1.1–2.2)
ALBUMIN SERPL-MCNC: 4.2 G/DL (ref 3.4–5)
ALP BLD-CCNC: 86 U/L (ref 40–129)
ALT SERPL-CCNC: 23 U/L (ref 10–40)
ANION GAP SERPL CALCULATED.3IONS-SCNC: 11 MMOL/L (ref 3–16)
AST SERPL-CCNC: 23 U/L (ref 15–37)
BASOPHILS ABSOLUTE: 0.1 K/UL (ref 0–0.2)
BASOPHILS RELATIVE PERCENT: 1.3 %
BILIRUB SERPL-MCNC: 0.4 MG/DL (ref 0–1)
BUN BLDV-MCNC: 11 MG/DL (ref 7–20)
CALCIUM SERPL-MCNC: 9.5 MG/DL (ref 8.3–10.6)
CHLORIDE BLD-SCNC: 107 MMOL/L (ref 99–110)
CHOLESTEROL, TOTAL: 139 MG/DL (ref 0–199)
CO2: 28 MMOL/L (ref 21–32)
CREAT SERPL-MCNC: 0.7 MG/DL (ref 0.6–1.2)
EOSINOPHILS ABSOLUTE: 0.1 K/UL (ref 0–0.6)
EOSINOPHILS RELATIVE PERCENT: 1.6 %
ESTIMATED AVERAGE GLUCOSE: 125.5 MG/DL
GFR AFRICAN AMERICAN: >60
GFR NON-AFRICAN AMERICAN: >60
GLUCOSE BLD-MCNC: 109 MG/DL (ref 70–99)
HBA1C MFR BLD: 6 %
HCT VFR BLD CALC: 41.4 % (ref 36–48)
HDLC SERPL-MCNC: 38 MG/DL (ref 40–60)
HEMOGLOBIN: 13.6 G/DL (ref 12–16)
LDL CHOLESTEROL CALCULATED: 69 MG/DL
LYMPHOCYTES ABSOLUTE: 2.4 K/UL (ref 1–5.1)
LYMPHOCYTES RELATIVE PERCENT: 30.6 %
MCH RBC QN AUTO: 28.8 PG (ref 26–34)
MCHC RBC AUTO-ENTMCNC: 32.9 G/DL (ref 31–36)
MCV RBC AUTO: 87.7 FL (ref 80–100)
MONOCYTES ABSOLUTE: 0.6 K/UL (ref 0–1.3)
MONOCYTES RELATIVE PERCENT: 7.5 %
NEUTROPHILS ABSOLUTE: 4.7 K/UL (ref 1.7–7.7)
NEUTROPHILS RELATIVE PERCENT: 59 %
PDW BLD-RTO: 14.8 % (ref 12.4–15.4)
PLATELET # BLD: 254 K/UL (ref 135–450)
PMV BLD AUTO: 9.8 FL (ref 5–10.5)
POTASSIUM SERPL-SCNC: 4.5 MMOL/L (ref 3.5–5.1)
RBC # BLD: 4.71 M/UL (ref 4–5.2)
SODIUM BLD-SCNC: 146 MMOL/L (ref 136–145)
TOTAL PROTEIN: 6.7 G/DL (ref 6.4–8.2)
TRIGL SERPL-MCNC: 161 MG/DL (ref 0–150)
VLDLC SERPL CALC-MCNC: 32 MG/DL
WBC # BLD: 7.9 K/UL (ref 4–11)

## 2022-10-31 ENCOUNTER — TELEPHONE (OUTPATIENT)
Dept: INTERNAL MEDICINE CLINIC | Age: 72
End: 2022-10-31

## 2022-10-31 NOTE — TELEPHONE ENCOUNTER
Patient called reporting chest cold symptoms. Wants in person appointment today only. Asked patient if she had COVID tested. She stated she had not because she has had multiple COVID vaccines. Advised patient I had a virtual appointment available around 4. She states she does not do those. Advised patient we have no in office openings at this time. Patient states she will just go to [facility] clinic.

## 2022-11-03 ENCOUNTER — HOSPITAL ENCOUNTER (OUTPATIENT)
Age: 72
Discharge: HOME | End: 2022-11-03
Payer: MEDICARE

## 2022-11-03 ENCOUNTER — HOSPITAL ENCOUNTER (OUTPATIENT)
Dept: ULTRASOUND IMAGING | Age: 72
Discharge: HOME | End: 2022-11-03
Payer: MEDICARE

## 2022-11-03 DIAGNOSIS — B18.1 CHRONIC HEPATITIS B WITHOUT DELTA AGENT WITHOUT CIRRHOSIS (HCC): ICD-10-CM

## 2022-11-03 DIAGNOSIS — B18.1 CHRONIC VIRAL HEPATITIS B WITHOUT DELTA AGENT AND WITHOUT COMA (HCC): ICD-10-CM

## 2022-11-03 PROCEDURE — 86692 HEPATITIS DELTA AGENT ANTBDY: CPT

## 2022-11-03 PROCEDURE — 87517 HEPATITIS B DNA QUANT: CPT

## 2022-11-03 PROCEDURE — 76705 ECHO EXAM OF ABDOMEN: CPT

## 2022-11-07 LAB — MISCELLANEOUS LAB TEST ORDER: NORMAL

## 2022-11-09 LAB
HBV QNT LOG, IU/ML: 3.43 LOG IU/ML
HBV QNT, IU/ML: ABNORMAL IU/ML
INTERPRETATION: DETECTED

## 2022-12-19 ENCOUNTER — OFFICE VISIT (OUTPATIENT)
Dept: INTERNAL MEDICINE CLINIC | Age: 72
End: 2022-12-19
Payer: MEDICARE

## 2022-12-19 VITALS
BODY MASS INDEX: 27.49 KG/M2 | HEIGHT: 64 IN | SYSTOLIC BLOOD PRESSURE: 140 MMHG | OXYGEN SATURATION: 96 % | WEIGHT: 161 LBS | HEART RATE: 81 BPM | DIASTOLIC BLOOD PRESSURE: 75 MMHG

## 2022-12-19 DIAGNOSIS — I10 ESSENTIAL HYPERTENSION: Primary | ICD-10-CM

## 2022-12-19 DIAGNOSIS — E78.2 MIXED HYPERLIPIDEMIA: ICD-10-CM

## 2022-12-19 PROCEDURE — G8417 CALC BMI ABV UP PARAM F/U: HCPCS | Performed by: INTERNAL MEDICINE

## 2022-12-19 PROCEDURE — 1090F PRES/ABSN URINE INCON ASSESS: CPT | Performed by: INTERNAL MEDICINE

## 2022-12-19 PROCEDURE — G8427 DOCREV CUR MEDS BY ELIG CLIN: HCPCS | Performed by: INTERNAL MEDICINE

## 2022-12-19 PROCEDURE — 3017F COLORECTAL CA SCREEN DOC REV: CPT | Performed by: INTERNAL MEDICINE

## 2022-12-19 PROCEDURE — G8484 FLU IMMUNIZE NO ADMIN: HCPCS | Performed by: INTERNAL MEDICINE

## 2022-12-19 PROCEDURE — 3074F SYST BP LT 130 MM HG: CPT | Performed by: INTERNAL MEDICINE

## 2022-12-19 PROCEDURE — G8399 PT W/DXA RESULTS DOCUMENT: HCPCS | Performed by: INTERNAL MEDICINE

## 2022-12-19 PROCEDURE — 4004F PT TOBACCO SCREEN RCVD TLK: CPT | Performed by: INTERNAL MEDICINE

## 2022-12-19 PROCEDURE — 99213 OFFICE O/P EST LOW 20 MIN: CPT | Performed by: INTERNAL MEDICINE

## 2022-12-19 PROCEDURE — 3078F DIAST BP <80 MM HG: CPT | Performed by: INTERNAL MEDICINE

## 2022-12-19 PROCEDURE — 1123F ACP DISCUSS/DSCN MKR DOCD: CPT | Performed by: INTERNAL MEDICINE

## 2022-12-19 NOTE — PROGRESS NOTES
Lab Results   Component Value Date    TSH 1.46 01/08/2013     Lab Results   Component Value Date    WBC 7.9 07/21/2022    HGB 13.6 07/21/2022    HCT 41.4 07/21/2022    MCV 87.7 07/21/2022     07/21/2022     No results found for: INR   No results found for: PSA   No results found for: [facility]     Patient Active Problem List   Diagnosis    Mixed hyperlipidemia    Smoker    Patient overweight    Hyperglycemia    Essential hypertension    Hypertriglyceridemia    Chronic viral hepatitis B without delta agent and without coma (HCC)    History of colon polyps    Colon, diverticulosis    Hepatitis B carrier ([facility] [address]    Varicose veins of bilateral lower extremities with other complications       No Known Allergies  Outpatient Medications Marked as Taking for the 12/19/22 encounter (Office Visit) with Vy Rubin MD   Medication Sig Dispense Refill    atorvastatin (LIPITOR) 20 MG tablet Take 1 tablet by mouth in the morning. 90 tablet 3    lisinopril (PRINIVIL;ZESTRIL) 10 MG tablet Take 1 tablet by mouth daily 90 tablet 3    Cholecalciferol (VITAMIN D-3 PO) Take 1,000 Units by mouth daily           Review of Systems: 14 systems were negative except of what was stated on HPI    Nursing note and vitals reviewed. Vitals:    12/19/22 0906   BP: (!) 146/74   Pulse: 81   SpO2: 96%   Weight: 161 lb (73 kg)   Height: 5' 4\" (1.626 m)     Wt Readings from Last 3 Encounters:   12/19/22 161 lb (73 kg)   07/21/22 160 lb (72.6 kg)   01/06/22 165 lb (74.8 kg)     BP Readings from Last 3 Encounters:   12/19/22 (!) 146/74   07/21/22 122/78   12/20/21 138/82     Body mass index is 27.64 kg/m². Constitutional: Patient appears well-developed and well-nourished. No distress. Head: Normocephalic and atraumatic. Neck: Normal range of motion. Neck supple. No thyroidmegaly. Cardiovascular: Normal rate, regular rhythm, normal heart sounds and intact distal pulses.    Pulmonary/Chest: Effort normal and breath sounds normal. No stridor. No respiratory distress. No wheezes and no rales. Abdominal: Soft. Bowel sounds are normal. No distension and no mass. No tenderness. No rebound and no guarding. Musculoskeletal: No edema and no tenderness. Skin: No rash or erythema.

## 2022-12-20 DIAGNOSIS — I10 ESSENTIAL HYPERTENSION: ICD-10-CM

## 2022-12-20 RX ORDER — LISINOPRIL 10 MG/1
TABLET ORAL
Qty: 90 TABLET | Refills: 3 | OUTPATIENT
Start: 2022-12-20

## 2022-12-22 ENCOUNTER — TELEPHONE (OUTPATIENT)
Dept: INTERNAL MEDICINE CLINIC | Age: 72
End: 2022-12-22

## 2022-12-22 DIAGNOSIS — I10 ESSENTIAL HYPERTENSION: Primary | ICD-10-CM

## 2022-12-22 RX ORDER — LISINOPRIL 20 MG/1
20 TABLET ORAL DAILY
Qty: 30 TABLET | Refills: 0 | Status: SHIPPED | OUTPATIENT
Start: 2022-12-22

## 2022-12-22 NOTE — TELEPHONE ENCOUNTER
Spoke with patient and informed her of new Rx sent and 's message. She wants to be seen and have B/P checked on 1-9-23. She has a mammogram scheduled at 10:40 am here in the [facility].   We worked her in at 1:30, but will try to work her in sooner that day if possible

## 2022-12-22 NOTE — TELEPHONE ENCOUNTER
Patient calling with B/P result, very upset. She was just at the [address] [place] [address] and her B/P was 180/100. She wanted to get her lisinopril 10 mg and there are no refills. She did take a pill this morning. Asking for refill or dose increase if needed.

## 2023-01-09 ENCOUNTER — HOSPITAL ENCOUNTER (OUTPATIENT)
Dept: WOMENS IMAGING | Age: 73
Discharge: HOME | End: 2023-01-09
Payer: MEDICARE

## 2023-01-09 ENCOUNTER — OFFICE VISIT (OUTPATIENT)
Dept: INTERNAL MEDICINE CLINIC | Age: 73
End: 2023-01-09
Payer: MEDICARE

## 2023-01-09 VITALS — OXYGEN SATURATION: 98 % | SYSTOLIC BLOOD PRESSURE: 140 MMHG | DIASTOLIC BLOOD PRESSURE: 80 MMHG | HEART RATE: 88 BPM

## 2023-01-09 DIAGNOSIS — I10 ESSENTIAL HYPERTENSION: Primary | ICD-10-CM

## 2023-01-09 DIAGNOSIS — Z12.31 ENCOUNTER FOR SCREENING MAMMOGRAM FOR BREAST CANCER: ICD-10-CM

## 2023-01-09 PROCEDURE — 3077F SYST BP >= 140 MM HG: CPT | Performed by: INTERNAL MEDICINE

## 2023-01-09 PROCEDURE — G8484 FLU IMMUNIZE NO ADMIN: HCPCS | Performed by: INTERNAL MEDICINE

## 2023-01-09 PROCEDURE — 99213 OFFICE O/P EST LOW 20 MIN: CPT | Performed by: INTERNAL MEDICINE

## 2023-01-09 PROCEDURE — 4004F PT TOBACCO SCREEN RCVD TLK: CPT | Performed by: INTERNAL MEDICINE

## 2023-01-09 PROCEDURE — G8417 CALC BMI ABV UP PARAM F/U: HCPCS | Performed by: INTERNAL MEDICINE

## 2023-01-09 PROCEDURE — 3079F DIAST BP 80-89 MM HG: CPT | Performed by: INTERNAL MEDICINE

## 2023-01-09 PROCEDURE — 1123F ACP DISCUSS/DSCN MKR DOCD: CPT | Performed by: INTERNAL MEDICINE

## 2023-01-09 PROCEDURE — G8427 DOCREV CUR MEDS BY ELIG CLIN: HCPCS | Performed by: INTERNAL MEDICINE

## 2023-01-09 PROCEDURE — 3017F COLORECTAL CA SCREEN DOC REV: CPT | Performed by: INTERNAL MEDICINE

## 2023-01-09 PROCEDURE — 1090F PRES/ABSN URINE INCON ASSESS: CPT | Performed by: INTERNAL MEDICINE

## 2023-01-09 PROCEDURE — G8399 PT W/DXA RESULTS DOCUMENT: HCPCS | Performed by: INTERNAL MEDICINE

## 2023-01-09 PROCEDURE — 77067 SCR MAMMO BI INCL CAD: CPT

## 2023-01-09 RX ORDER — ATENOLOL 50 MG/1
50 TABLET ORAL DAILY
Qty: 30 TABLET | Refills: 0 | Status: SHIPPED | OUTPATIENT
Start: 2023-01-09

## 2023-01-09 ASSESSMENT — PATIENT HEALTH QUESTIONNAIRE - PHQ9
SUM OF ALL RESPONSES TO PHQ QUESTIONS 1-9: 0
1. LITTLE INTEREST OR PLEASURE IN DOING THINGS: 0
SUM OF ALL RESPONSES TO PHQ QUESTIONS 1-9: 0
SUM OF ALL RESPONSES TO PHQ9 QUESTIONS 1 & 2: 0
2. FEELING DOWN, DEPRESSED OR HOPELESS: 0
SUM OF ALL RESPONSES TO PHQ QUESTIONS 1-9: 0
SUM OF ALL RESPONSES TO PHQ QUESTIONS 1-9: 0
SUM OF ALL RESPONSES TO PHQ9 QUESTIONS 1 & 2: 0
SUM OF ALL RESPONSES TO PHQ QUESTIONS 1-9: 0
2. FEELING DOWN, DEPRESSED OR HOPELESS: 0
1. LITTLE INTEREST OR PLEASURE IN DOING THINGS: 0

## 2023-01-09 NOTE — PROGRESS NOTES
Verenice Barrios  YOB: 1950    Date of Service:  1/9/2023    Chief Complaint:      Chief Complaint   Patient presents with    Hypertension     On higher dose of lisiopril       Assessment/Plan:  Aishwarya Martinez was seen today for hypertension. Diagnoses and all orders for this visit:    Essential hypertension  Start atenolol (TENORMIN) 50 MG tablet; Take 1 tablet by mouth daily once out of Lisinopril in 2 weeks. Return BP check 2/20. HPI:  Verenice Barrios is a 67 y.o. Treatment Adherence:  Medication compliance:  compliant most of the time  Diet compliance:  compliant most of the time  Weight trend: stable  Current exercise: aerobics 4 time(s) per week  Barriers: none     Hypertension:  Home blood pressure monitoring: yes 122-132/60-70 on Lisinopril 20 mg qd. She is adherent to a low sodium diet. Patient denies chest pain, shortness of breath, headache, lightheadedness, blurred vision, peripheral edema, palpitations, dry cough and fatigue. Antihypertensive medication side effects: no medication side effects noted. Use of agents associated with hypertension: none. Hyperlipidemia:  No new myalgias or GI upset on aorvastatin (Lipitor) 20 mg qd.      Lab Results   Component Value Date    LABA1C 6.0 07/21/2022    LABA1C 5.9 07/08/2020    LABA1C 6.0 07/02/2019     Lab Results   Component Value Date     (H) 07/21/2022    K 4.5 07/21/2022     07/21/2022    CO2 28 07/21/2022    BUN 11 07/21/2022    CREATININE 0.7 07/21/2022    GLUCOSE 109 (H) 07/21/2022    CALCIUM 9.5 07/21/2022     Lab Results   Component Value Date/Time    CHOL 139 07/21/2022 10:09 AM    TRIG 161 07/21/2022 10:09 AM    HDL 38 07/21/2022 10:09 AM    LDLCALC 69 07/21/2022 10:09 AM     Lab Results   Component Value Date    ALT 23 07/21/2022    AST 23 07/21/2022     Lab Results   Component Value Date    TSH 1.46 01/08/2013     Lab Results   Component Value Date    WBC 7.9 07/21/2022    HGB 13.6 07/21/2022    HCT 41.4 07/21/2022    MCV 87.7 07/21/2022     07/21/2022     No results found for: INR   No results found for: PSA   No results found for: [facility]     Patient Active Problem List   Diagnosis    Mixed hyperlipidemia    Smoker    Patient overweight    Hyperglycemia    Essential hypertension    Hypertriglyceridemia    Chronic viral hepatitis B without delta agent and without coma (HCC)    History of colon polyps    Colon, diverticulosis    Hepatitis B carrier ([address]    Varicose veins of bilateral lower extremities with other complications       No Known Allergies  Outpatient Medications Marked as Taking for the 1/9/23 encounter (Office Visit) with Usha Rubin MD   Medication Sig Dispense Refill    lisinopril (PRINIVIL;ZESTRIL) 20 MG tablet Take 1 tablet by mouth daily 30 tablet 0    atorvastatin (LIPITOR) 20 MG tablet Take 1 tablet by mouth in the morning. 90 tablet 3    Cholecalciferol (VITAMIN D-3 PO) Take 1,000 Units by mouth daily           Review of Systems: 14 systems were negative except of what was stated on HPI    Nursing note and vitals reviewed. Vitals:    01/09/23 1330 01/09/23 1331 01/09/23 1343   BP: (!) 168/82 (!) 168/90 (!) 140/80   Site: Right Upper Arm Left Upper Arm    Position:  Sitting    Pulse: 88     SpO2: 98%       Wt Readings from Last 3 Encounters:   12/19/22 161 lb (73 kg)   07/21/22 160 lb (72.6 kg)   01/06/22 165 lb (74.8 kg)     BP Readings from Last 3 Encounters:   01/09/23 (!) 168/90   12/19/22 (!) 140/75   07/21/22 122/78     There is no height or weight on file to calculate BMI. Constitutional: Patient appears well-developed and well-nourished. No distress. Head: Normocephalic and atraumatic. Neck: Normal range of motion. Neck supple. No thyroidmegaly. Cardiovascular: Normal rate, regular rhythm, normal heart sounds and intact distal pulses. Pulmonary/Chest: Effort normal and breath sounds normal. No stridor. No respiratory distress. No wheezes and no rales.    Abdominal: Soft. Bowel sounds are normal. No distension and no mass. No tenderness. No rebound and no guarding. Musculoskeletal: No edema and no tenderness. Skin: No rash or erythema.

## 2023-02-13 ENCOUNTER — OFFICE VISIT (OUTPATIENT)
Dept: INTERNAL MEDICINE CLINIC | Age: 73
End: 2023-02-13
Payer: MEDICARE

## 2023-02-13 VITALS
OXYGEN SATURATION: 94 % | SYSTOLIC BLOOD PRESSURE: 180 MMHG | HEIGHT: 64 IN | BODY MASS INDEX: 28.51 KG/M2 | DIASTOLIC BLOOD PRESSURE: 90 MMHG | WEIGHT: 167 LBS | HEART RATE: 85 BPM

## 2023-02-13 DIAGNOSIS — I10 ESSENTIAL HYPERTENSION: ICD-10-CM

## 2023-02-13 PROCEDURE — 1090F PRES/ABSN URINE INCON ASSESS: CPT | Performed by: INTERNAL MEDICINE

## 2023-02-13 PROCEDURE — G8399 PT W/DXA RESULTS DOCUMENT: HCPCS | Performed by: INTERNAL MEDICINE

## 2023-02-13 PROCEDURE — 3077F SYST BP >= 140 MM HG: CPT | Performed by: INTERNAL MEDICINE

## 2023-02-13 PROCEDURE — 99213 OFFICE O/P EST LOW 20 MIN: CPT | Performed by: INTERNAL MEDICINE

## 2023-02-13 PROCEDURE — G8417 CALC BMI ABV UP PARAM F/U: HCPCS | Performed by: INTERNAL MEDICINE

## 2023-02-13 PROCEDURE — 4004F PT TOBACCO SCREEN RCVD TLK: CPT | Performed by: INTERNAL MEDICINE

## 2023-02-13 PROCEDURE — 3017F COLORECTAL CA SCREEN DOC REV: CPT | Performed by: INTERNAL MEDICINE

## 2023-02-13 PROCEDURE — G8484 FLU IMMUNIZE NO ADMIN: HCPCS | Performed by: INTERNAL MEDICINE

## 2023-02-13 PROCEDURE — 1123F ACP DISCUSS/DSCN MKR DOCD: CPT | Performed by: INTERNAL MEDICINE

## 2023-02-13 PROCEDURE — G8427 DOCREV CUR MEDS BY ELIG CLIN: HCPCS | Performed by: INTERNAL MEDICINE

## 2023-02-13 PROCEDURE — 3080F DIAST BP >= 90 MM HG: CPT | Performed by: INTERNAL MEDICINE

## 2023-02-13 RX ORDER — ATENOLOL 100 MG/1
100 TABLET ORAL DAILY
Qty: 30 TABLET | Refills: 0 | Status: SHIPPED | OUTPATIENT
Start: 2023-02-13

## 2023-02-13 SDOH — ECONOMIC STABILITY: FOOD INSECURITY: WITHIN THE PAST 12 MONTHS, THE FOOD YOU BOUGHT JUST DIDN'T LAST AND YOU DIDN'T HAVE MONEY TO GET MORE.: NEVER TRUE

## 2023-02-13 SDOH — ECONOMIC STABILITY: FOOD INSECURITY: WITHIN THE PAST 12 MONTHS, YOU WORRIED THAT YOUR FOOD WOULD RUN OUT BEFORE YOU GOT MONEY TO BUY MORE.: NEVER TRUE

## 2023-02-13 SDOH — ECONOMIC STABILITY: HOUSING INSECURITY
IN THE LAST 12 MONTHS, WAS THERE A TIME WHEN YOU DID NOT HAVE A STEADY PLACE TO SLEEP OR SLEPT IN A SHELTER (INCLUDING NOW)?: NO

## 2023-02-13 SDOH — ECONOMIC STABILITY: INCOME INSECURITY: HOW HARD IS IT FOR YOU TO PAY FOR THE VERY BASICS LIKE FOOD, HOUSING, MEDICAL CARE, AND HEATING?: NOT HARD AT ALL

## 2023-02-13 NOTE — PROGRESS NOTES
Marine Cox  YOB: 1950    Date of Service:  2/13/2023    Chief Complaint:      Chief Complaint   Patient presents with    Hypertension       Assessment/Plan:  Carine Worthington was seen today for hypertension. Diagnoses and all orders for this visit:    Essential hypertension  Restart atenolol (TENORMIN) 100 MG tablet; Take 1/2 tablet by mouth daily with Lisinopril 20 mg qd  Call back with blood pressure results next week to see how blood pressure is on both or just switch to just plain atenolol 100 mg daily. Return BP 3/13. HPI:  Marine Cox is a 68 y.o. Treatment Adherence:  Medication compliance:  compliant most of the time  Diet compliance:  compliant most of the time  Weight trend: stable  Current exercise: aerobics 4 time(s) per week  Barriers: none     Hypertension:  Home blood pressure monitoring: yes 140-160/60 on Lisinopril 20 mg daily for 3 days since she ran out of Atenolol 50 mg daily. She is adherent to a low sodium diet. Patient denies chest pain, shortness of breath, headache, lightheadedness, blurred vision, peripheral edema, palpitations, dry cough and fatigue. Antihypertensive medication side effects: no medication side effects noted. Use of agents associated with hypertension: none. Hyperlipidemia:  No new myalgias or GI upset on aorvastatin (Lipitor) 20 mg qd.      Lab Results   Component Value Date    LABA1C 6.0 07/21/2022    LABA1C 5.9 07/08/2020    LABA1C 6.0 07/02/2019     Lab Results   Component Value Date     (H) 07/21/2022    K 4.5 07/21/2022     07/21/2022    CO2 28 07/21/2022    BUN 11 07/21/2022    CREATININE 0.7 07/21/2022    GLUCOSE 109 (H) 07/21/2022    CALCIUM 9.5 07/21/2022     Lab Results   Component Value Date/Time    CHOL 139 07/21/2022 10:09 AM    TRIG 161 07/21/2022 10:09 AM    HDL 38 07/21/2022 10:09 AM    LDLCALC 69 07/21/2022 10:09 AM     Lab Results   Component Value Date    ALT 23 07/21/2022    AST 23 07/21/2022     Lab Results Component Value Date    TSH 1.46 01/08/2013     Lab Results   Component Value Date    WBC 7.9 07/21/2022    HGB 13.6 07/21/2022    HCT 41.4 07/21/2022    MCV 87.7 07/21/2022     07/21/2022     No results found for: INR   No results found for: PSA   No results found for: [facility]     Patient Active Problem List   Diagnosis    Mixed hyperlipidemia    Smoker    Patient overweight    Hyperglycemia    Essential hypertension    Hypertriglyceridemia    Chronic viral hepatitis B without delta agent and without coma (HCC)    History of colon polyps    Colon, diverticulosis    Hepatitis B carrier ([facility] [address]    Varicose veins of bilateral lower extremities with other complications       No Known Allergies  Outpatient Medications Marked as Taking for the 2/13/23 encounter (Office Visit) with Mars Jimenez MD   Medication Sig Dispense Refill    atenolol (TENORMIN) 50 MG tablet Take 1 tablet by mouth daily 30 tablet 0    atorvastatin (LIPITOR) 20 MG tablet Take 1 tablet by mouth in the morning. 90 tablet 3    Cholecalciferol (VITAMIN D-3 PO) Take 1,000 Units by mouth daily           Review of Systems: 14 systems were negative except of what was stated on HPI    Nursing note and vitals reviewed. Vitals:    02/13/23 1053   BP: (!) 182/90   Pulse: 85   SpO2: 94%   Weight: 167 lb (75.8 kg)   Height: 5' 4\" (1.626 m)     Wt Readings from Last 3 Encounters:   02/13/23 167 lb (75.8 kg)   12/19/22 161 lb (73 kg)   07/21/22 160 lb (72.6 kg)     BP Readings from Last 3 Encounters:   02/13/23 (!) 182/90   01/09/23 (!) 140/80   12/19/22 (!) 140/75     Body mass index is 28.67 kg/m². Constitutional: Patient appears well-developed and well-nourished. No distress. Head: Normocephalic and atraumatic. Neck: Normal range of motion. Neck supple. No thyroidmegaly. Cardiovascular: Normal rate, regular rhythm, normal heart sounds and intact distal pulses. Pulmonary/Chest: Effort normal and breath sounds normal. No stridor.  No respiratory distress. No wheezes and no rales. Abdominal: Soft. Bowel sounds are normal. No distension and no mass. No tenderness. No rebound and no guarding. Musculoskeletal: No edema and no tenderness. Skin: No rash or erythema.

## 2023-02-27 ENCOUNTER — TELEPHONE (OUTPATIENT)
Dept: INTERNAL MEDICINE CLINIC | Age: 73
End: 2023-02-27

## 2023-02-27 DIAGNOSIS — I10 ESSENTIAL HYPERTENSION: ICD-10-CM

## 2023-02-27 RX ORDER — LISINOPRIL 20 MG/1
20 TABLET ORAL DAILY
Qty: 30 TABLET | Refills: 0 | Status: SHIPPED | OUTPATIENT
Start: 2023-02-27

## 2023-02-27 NOTE — TELEPHONE ENCOUNTER
LAST REFILL 12-  AMOUNT 30     0 REFILLS  LAST VISIT 2-  NEXT VISIT 3-  Confirmed with Shelia Regino at [facility] that she has no refills of lisinopril?

## 2023-02-27 NOTE — TELEPHONE ENCOUNTER
Returned call to patient, she states after checking,-Lisinopril 20 in the morning, 1/2 of Atenolol at night. =(50 mg only)  She  was not sure when to take a whole atenolol 100 mg. She does need lisinopril 20 mg refills sent if she is to stay on them.

## 2023-02-27 NOTE — TELEPHONE ENCOUNTER
B/P readings  2- 8:25  155/76  2-15- 9:23 am 154/70  2-22-23 1:10 pm 155/66  Other readings  158/69  156/67  155/76  154/70  162/71  154/66  163/74  155/78 7:30 this am.    These readings have all been taken while patient is taking lisinopril 10 mg in the morning, and atenolol 50 mg. At night. She is now out of her original lisinopril 20 mg., has the atenolol 100 mg. Only now.  Please advise

## 2023-03-13 ENCOUNTER — OFFICE VISIT (OUTPATIENT)
Dept: INTERNAL MEDICINE CLINIC | Age: 73
End: 2023-03-13
Payer: MEDICARE

## 2023-03-13 VITALS
OXYGEN SATURATION: 96 % | WEIGHT: 165 LBS | HEART RATE: 63 BPM | BODY MASS INDEX: 28.17 KG/M2 | DIASTOLIC BLOOD PRESSURE: 80 MMHG | HEIGHT: 64 IN | SYSTOLIC BLOOD PRESSURE: 160 MMHG

## 2023-03-13 DIAGNOSIS — I10 ESSENTIAL HYPERTENSION: Primary | ICD-10-CM

## 2023-03-13 PROCEDURE — 3077F SYST BP >= 140 MM HG: CPT | Performed by: INTERNAL MEDICINE

## 2023-03-13 PROCEDURE — 1090F PRES/ABSN URINE INCON ASSESS: CPT | Performed by: INTERNAL MEDICINE

## 2023-03-13 PROCEDURE — G8484 FLU IMMUNIZE NO ADMIN: HCPCS | Performed by: INTERNAL MEDICINE

## 2023-03-13 PROCEDURE — 1123F ACP DISCUSS/DSCN MKR DOCD: CPT | Performed by: INTERNAL MEDICINE

## 2023-03-13 PROCEDURE — 4004F PT TOBACCO SCREEN RCVD TLK: CPT | Performed by: INTERNAL MEDICINE

## 2023-03-13 PROCEDURE — G8399 PT W/DXA RESULTS DOCUMENT: HCPCS | Performed by: INTERNAL MEDICINE

## 2023-03-13 PROCEDURE — G8417 CALC BMI ABV UP PARAM F/U: HCPCS | Performed by: INTERNAL MEDICINE

## 2023-03-13 PROCEDURE — 3017F COLORECTAL CA SCREEN DOC REV: CPT | Performed by: INTERNAL MEDICINE

## 2023-03-13 PROCEDURE — G8427 DOCREV CUR MEDS BY ELIG CLIN: HCPCS | Performed by: INTERNAL MEDICINE

## 2023-03-13 PROCEDURE — 3079F DIAST BP 80-89 MM HG: CPT | Performed by: INTERNAL MEDICINE

## 2023-03-13 PROCEDURE — 99213 OFFICE O/P EST LOW 20 MIN: CPT | Performed by: INTERNAL MEDICINE

## 2023-03-13 NOTE — PATIENT INSTRUCTIONS
Take Atenolol 100 mg 1/2 this AM and 1/2 tonight, then tomorrow start 1 pill in the morning daily  Tonight you will take Lisinopril 20 mg 1/2 tonight, then tomorrow night, you will take 1 1/2 pill every night

## 2023-03-13 NOTE — PROGRESS NOTES
Jordin Pak  YOB: 1950    Date of Service:  3/13/2023    Chief Complaint:      Chief Complaint   Patient presents with    Hypertension       Assessment/Plan:  Edna Beaulieu was seen today for hypertension. Diagnoses and all orders for this visit:    Essential hypertension  Increase lisinopril 20 mg 1 1/2 morning and continue Atenolol 100 mg evening and call in with results Thursday    Return BP check 4/13. HPI:  Jordin Pak is a 68 y.o. Treatment Adherence:  Medication compliance:  compliant most of the time  Diet compliance:  compliant most of the time  Weight trend: stable  Current exercise: aerobics 4 time(s) per week  Barriers: none     Hypertension:  Home blood pressure monitoring: yes 131-160/60-80 on Lisinopril 20 mg AM and Atenolol 100 mg PM.  She is adherent to a low sodium diet. Patient denies chest pain, shortness of breath, headache, lightheadedness, blurred vision, peripheral edema, palpitations, dry cough and fatigue. Antihypertensive medication side effects: no medication side effects noted. Use of agents associated with hypertension: none. Hyperlipidemia:  No new myalgias or GI upset on aorvastatin (Lipitor) 20 mg qd.      Lab Results   Component Value Date    LABA1C 6.0 07/21/2022    LABA1C 5.9 07/08/2020    LABA1C 6.0 07/02/2019     Lab Results   Component Value Date     (H) 07/21/2022    K 4.5 07/21/2022     07/21/2022    CO2 28 07/21/2022    BUN 11 07/21/2022    CREATININE 0.7 07/21/2022    GLUCOSE 109 (H) 07/21/2022    CALCIUM 9.5 07/21/2022     Lab Results   Component Value Date/Time    CHOL 139 07/21/2022 10:09 AM    TRIG 161 07/21/2022 10:09 AM    HDL 38 07/21/2022 10:09 AM    LDLCALC 69 07/21/2022 10:09 AM     Lab Results   Component Value Date    ALT 23 07/21/2022    AST 23 07/21/2022     Lab Results   Component Value Date    TSH 1.46 01/08/2013     Lab Results   Component Value Date    WBC 7.9 07/21/2022    HGB 13.6 07/21/2022    HCT 41.4 07/21/2022 MCV 87.7 07/21/2022     07/21/2022     No results found for: INR   No results found for: PSA   No results found for: [facility]     Patient Active Problem List   Diagnosis    Mixed hyperlipidemia    Smoker    Patient overweight    Hyperglycemia    Essential hypertension    Hypertriglyceridemia    Chronic viral hepatitis B without delta agent and without coma (HCC)    History of colon polyps    Colon, diverticulosis    Hepatitis B carrier ([address]    Varicose veins of bilateral lower extremities with other complications       No Known Allergies  Outpatient Medications Marked as Taking for the 3/13/23 encounter (Office Visit) with Dianna Noel MD   Medication Sig Dispense Refill    lisinopril (PRINIVIL;ZESTRIL) 20 MG tablet Take 1 tablet by mouth daily 30 tablet 0    atenolol (TENORMIN) 100 MG tablet Take 1 tablet by mouth daily 30 tablet 0    atorvastatin (LIPITOR) 20 MG tablet Take 1 tablet by mouth in the morning. 90 tablet 3    Cholecalciferol (VITAMIN D-3 PO) Take 1,000 Units by mouth daily           Review of Systems: 14 systems were negative except of what was stated on HPI    Nursing note and vitals reviewed. Vitals:    03/13/23 0949 03/13/23 1024   BP: (!) 192/82 (!) 160/80   Pulse: 63    SpO2: 96%    Weight: 165 lb (74.8 kg)    Height: 5' 4\" (1.626 m)      Wt Readings from Last 3 Encounters:   03/13/23 165 lb (74.8 kg)   02/13/23 167 lb (75.8 kg)   12/19/22 161 lb (73 kg)     BP Readings from Last 3 Encounters:   03/13/23 (!) 160/80   02/13/23 (!) 180/90   01/09/23 (!) 140/80     Body mass index is 28.32 kg/m². Constitutional: Patient appears well-developed and well-nourished. No distress. Head: Normocephalic and atraumatic. Neck: Normal range of motion. Neck supple. No thyroidmegaly. Cardiovascular: Normal rate, regular rhythm, normal heart sounds and intact distal pulses. Pulmonary/Chest: Effort normal and breath sounds normal. No stridor. No respiratory distress. No wheezes and no rales. Abdominal: Soft. Bowel sounds are normal. No distension and no mass. No tenderness. No rebound and no guarding. Musculoskeletal: No edema and no tenderness. Skin: No rash or erythema.

## 2023-03-16 ENCOUNTER — TELEPHONE (OUTPATIENT)
Dept: INTERNAL MEDICINE CLINIC | Age: 73
End: 2023-03-16

## 2023-03-16 NOTE — TELEPHONE ENCOUNTER
Patient calling to report B/P readings    3-14-23 127/62  noon  3-15-23  139/68  11:17 am  3-16-23 132/68  6:15 am  3-16-23  137/70  9:00 am  3-16-23 159/83   4:20 pm

## 2023-03-20 DIAGNOSIS — I10 ESSENTIAL HYPERTENSION: ICD-10-CM

## 2023-03-20 RX ORDER — LISINOPRIL 30 MG/1
30 TABLET ORAL DAILY
Qty: 90 TABLET | Refills: 1 | Status: SHIPPED | OUTPATIENT
Start: 2023-03-20

## 2023-03-20 RX ORDER — ATENOLOL 100 MG/1
100 TABLET ORAL DAILY
Qty: 90 TABLET | Refills: 1 | Status: SHIPPED | OUTPATIENT
Start: 2023-03-20

## 2023-07-24 ENCOUNTER — OFFICE VISIT (OUTPATIENT)
Dept: INTERNAL MEDICINE CLINIC | Age: 73
End: 2023-07-24

## 2023-07-24 VITALS
BODY MASS INDEX: 28 KG/M2 | HEIGHT: 64 IN | OXYGEN SATURATION: 94 % | WEIGHT: 164 LBS | DIASTOLIC BLOOD PRESSURE: 70 MMHG | HEART RATE: 86 BPM | SYSTOLIC BLOOD PRESSURE: 160 MMHG

## 2023-07-24 DIAGNOSIS — Z00.00 MEDICARE ANNUAL WELLNESS VISIT, SUBSEQUENT: Primary | ICD-10-CM

## 2023-07-24 DIAGNOSIS — I10 ESSENTIAL HYPERTENSION: ICD-10-CM

## 2023-07-24 DIAGNOSIS — E78.2 MIXED HYPERLIPIDEMIA: ICD-10-CM

## 2023-07-24 RX ORDER — ATORVASTATIN CALCIUM 20 MG/1
20 TABLET, FILM COATED ORAL DAILY
Qty: 90 TABLET | Refills: 3 | Status: SHIPPED | OUTPATIENT
Start: 2023-07-24

## 2023-07-24 ASSESSMENT — PATIENT HEALTH QUESTIONNAIRE - PHQ9
SUM OF ALL RESPONSES TO PHQ QUESTIONS 1-9: 2
2. FEELING DOWN, DEPRESSED OR HOPELESS: 1
SUM OF ALL RESPONSES TO PHQ QUESTIONS 1-9: 2
SUM OF ALL RESPONSES TO PHQ9 QUESTIONS 1 & 2: 2
1. LITTLE INTEREST OR PLEASURE IN DOING THINGS: 1
SUM OF ALL RESPONSES TO PHQ QUESTIONS 1-9: 2
SUM OF ALL RESPONSES TO PHQ QUESTIONS 1-9: 2

## 2023-07-24 ASSESSMENT — LIFESTYLE VARIABLES
HOW OFTEN DO YOU HAVE A DRINK CONTAINING ALCOHOL: 2-3 TIMES A WEEK
HOW MANY STANDARD DRINKS CONTAINING ALCOHOL DO YOU HAVE ON A TYPICAL DAY: 1 OR 2

## 2023-07-25 LAB
ALBUMIN SERPL-MCNC: 4.4 G/DL (ref 3.4–5)
ALBUMIN/GLOB SERPL: 1.8 {RATIO} (ref 1.1–2.2)
ALP SERPL-CCNC: 87 U/L (ref 40–129)
ALT SERPL-CCNC: 27 U/L (ref 10–40)
ANION GAP SERPL CALCULATED.3IONS-SCNC: 7 MMOL/L (ref 3–16)
AST SERPL-CCNC: 25 U/L (ref 15–37)
BASOPHILS # BLD: 0.1 K/UL (ref 0–0.2)
BASOPHILS NFR BLD: 0.8 %
BILIRUB SERPL-MCNC: 0.4 MG/DL (ref 0–1)
BUN SERPL-MCNC: 12 MG/DL (ref 7–20)
CALCIUM SERPL-MCNC: 9.8 MG/DL (ref 8.3–10.6)
CHLORIDE SERPL-SCNC: 107 MMOL/L (ref 99–110)
CHOLEST SERPL-MCNC: 148 MG/DL (ref 0–199)
CO2 SERPL-SCNC: 30 MMOL/L (ref 21–32)
CREAT SERPL-MCNC: 0.9 MG/DL (ref 0.6–1.2)
DEPRECATED RDW RBC AUTO: 14.6 % (ref 12.4–15.4)
EOSINOPHIL # BLD: 0.2 K/UL (ref 0–0.6)
EOSINOPHIL NFR BLD: 2.6 %
GFR SERPLBLD CREATININE-BSD FMLA CKD-EPI: >60 ML/MIN/{1.73_M2}
GLUCOSE SERPL-MCNC: 101 MG/DL (ref 70–99)
HCT VFR BLD AUTO: 42.6 % (ref 36–48)
HDLC SERPL-MCNC: 38 MG/DL (ref 40–60)
HGB BLD-MCNC: 14 G/DL (ref 12–16)
LDLC SERPL CALC-MCNC: 74 MG/DL
LYMPHOCYTES # BLD: 3.2 K/UL (ref 1–5.1)
LYMPHOCYTES NFR BLD: 34.7 %
MCH RBC QN AUTO: 29.2 PG (ref 26–34)
MCHC RBC AUTO-ENTMCNC: 32.9 G/DL (ref 31–36)
MCV RBC AUTO: 88.9 FL (ref 80–100)
MONOCYTES # BLD: 0.8 K/UL (ref 0–1.3)
MONOCYTES NFR BLD: 8.8 %
NEUTROPHILS # BLD: 5 K/UL (ref 1.7–7.7)
NEUTROPHILS NFR BLD: 53.1 %
PLATELET # BLD AUTO: 243 K/UL (ref 135–450)
PMV BLD AUTO: 10.6 FL (ref 5–10.5)
POTASSIUM SERPL-SCNC: 5 MMOL/L (ref 3.5–5.1)
PROT SERPL-MCNC: 6.9 G/DL (ref 6.4–8.2)
RBC # BLD AUTO: 4.79 M/UL (ref 4–5.2)
SODIUM SERPL-SCNC: 144 MMOL/L (ref 136–145)
TRIGL SERPL-MCNC: 178 MG/DL (ref 0–150)
VLDLC SERPL CALC-MCNC: 36 MG/DL
WBC # BLD AUTO: 9.3 K/UL (ref 4–11)

## 2023-08-28 ENCOUNTER — OFFICE VISIT (OUTPATIENT)
Dept: INTERNAL MEDICINE CLINIC | Age: 73
End: 2023-08-28

## 2023-08-28 VITALS
WEIGHT: 166 LBS | OXYGEN SATURATION: 99 % | SYSTOLIC BLOOD PRESSURE: 160 MMHG | BODY MASS INDEX: 28.34 KG/M2 | HEART RATE: 68 BPM | HEIGHT: 64 IN | DIASTOLIC BLOOD PRESSURE: 80 MMHG

## 2023-08-28 DIAGNOSIS — I10 ESSENTIAL HYPERTENSION: Primary | ICD-10-CM

## 2023-08-28 RX ORDER — ATENOLOL AND CHLORTHALIDONE TABLET 100; 25 MG/1; MG/1
1 TABLET ORAL EVERY MORNING
Qty: 90 TABLET | Refills: 0 | Status: SHIPPED | OUTPATIENT
Start: 2023-08-28

## 2023-08-28 RX ORDER — LISINOPRIL 40 MG/1
40 TABLET ORAL EVERY EVENING
Qty: 90 TABLET | Refills: 0 | Status: SHIPPED | OUTPATIENT
Start: 2023-08-28

## 2023-08-28 NOTE — PROGRESS NOTES
Tom Lopez  YOB: 1950    Date of Service:  8/28/2023    Chief Complaint:      Chief Complaint   Patient presents with    Hypertension       Assessment/Plan:  Elvin Harry was seen today for hypertension. Diagnoses and all orders for this visit:    Essential hypertension  Increase lisinopril (PRINIVIL;ZESTRIL) 40 MG tablet; Take 1 tablet by mouth every evening  Increase atenolol-chlorthalidone (TENORETIC) 100-25 MG per tablet; Take 1 tablet by mouth every morning    Return BP 9/25. HPI:  Tom Lopez is a 68 y.o. Treatment Adherence:  Medication compliance:  compliant most of the time  Diet compliance:  compliant most of the time  Weight trend: stable  Current exercise: aerobics 4 time(s) per week  Barriers: none     Hypertension:  Home blood pressure monitoring: yes 140/60-70 on Lisinopril 30 mg 1.5 AM and Atenolol 100 mg PM.  She is adherent to a low sodium diet. Patient denies chest pain, shortness of breath, headache, lightheadedness, blurred vision, peripheral edema, palpitations, dry cough and fatigue. Antihypertensive medication side effects: no medication side effects noted. Use of agents associated with hypertension: none. Hyperlipidemia:  No new myalgias or GI upset on aorvastatin (Lipitor) 20 mg qd.   Chronic viral Hep B:  stable per GI    Lab Results   Component Value Date    LABA1C 6.0 07/21/2022    LABA1C 5.9 07/08/2020    LABA1C 6.0 07/02/2019     Lab Results   Component Value Date     07/24/2023    K 5.0 07/24/2023     07/24/2023    CO2 30 07/24/2023    BUN 12 07/24/2023    CREATININE 0.9 07/24/2023    GLUCOSE 101 (H) 07/24/2023    CALCIUM 9.8 07/24/2023     Lab Results   Component Value Date/Time    CHOL 148 07/24/2023 10:06 AM    TRIG 178 07/24/2023 10:06 AM    HDL 38 07/24/2023 10:06 AM    LDLCALC 74 07/24/2023 10:06 AM     Lab Results   Component Value Date    ALT 27 07/24/2023    AST 25 07/24/2023     Lab Results   Component Value Date    TSH 1.46

## 2023-09-15 DIAGNOSIS — I10 ESSENTIAL HYPERTENSION: ICD-10-CM

## 2023-09-18 RX ORDER — ATENOLOL 100 MG/1
100 TABLET ORAL DAILY
Qty: 90 TABLET | Refills: 1 | OUTPATIENT
Start: 2023-09-18

## 2023-09-18 RX ORDER — LISINOPRIL 30 MG/1
30 TABLET ORAL DAILY
Qty: 90 TABLET | Refills: 1 | OUTPATIENT
Start: 2023-09-18

## 2023-09-25 ENCOUNTER — OFFICE VISIT (OUTPATIENT)
Dept: INTERNAL MEDICINE CLINIC | Age: 73
End: 2023-09-25

## 2023-09-25 VITALS
HEIGHT: 64 IN | WEIGHT: 164 LBS | OXYGEN SATURATION: 98 % | DIASTOLIC BLOOD PRESSURE: 62 MMHG | HEART RATE: 70 BPM | SYSTOLIC BLOOD PRESSURE: 134 MMHG | BODY MASS INDEX: 28 KG/M2

## 2023-09-25 DIAGNOSIS — I10 ESSENTIAL HYPERTENSION: Primary | ICD-10-CM

## 2023-09-25 DIAGNOSIS — Z23 NEED FOR INFLUENZA VACCINATION: ICD-10-CM

## 2023-09-25 NOTE — PROGRESS NOTES
Cardiovascular: Normal rate, regular rhythm, normal heart sounds and intact distal pulses. Pulmonary/Chest: Effort normal and breath sounds normal. No stridor. No respiratory distress. No wheezes and no rales. Abdominal: Soft. Bowel sounds are normal. No distension and no mass. No tenderness. No rebound and no guarding. Musculoskeletal: No edema and no tenderness. Skin: No rash or erythema.

## 2023-09-26 LAB
ALBUMIN SERPL-MCNC: 4.5 G/DL (ref 3.4–5)
ANION GAP SERPL CALCULATED.3IONS-SCNC: 10 MMOL/L (ref 3–16)
BUN SERPL-MCNC: 20 MG/DL (ref 7–20)
CALCIUM SERPL-MCNC: 9.5 MG/DL (ref 8.3–10.6)
CHLORIDE SERPL-SCNC: 99 MMOL/L (ref 99–110)
CO2 SERPL-SCNC: 29 MMOL/L (ref 21–32)
CREAT SERPL-MCNC: 0.9 MG/DL (ref 0.6–1.2)
GFR SERPLBLD CREATININE-BSD FMLA CKD-EPI: >60 ML/MIN/{1.73_M2}
GLUCOSE SERPL-MCNC: 107 MG/DL (ref 70–99)
PHOSPHATE SERPL-MCNC: 4.3 MG/DL (ref 2.5–4.9)
POTASSIUM SERPL-SCNC: 4.4 MMOL/L (ref 3.5–5.1)
SODIUM SERPL-SCNC: 138 MMOL/L (ref 136–145)

## 2023-11-26 DIAGNOSIS — I10 ESSENTIAL HYPERTENSION: ICD-10-CM

## 2023-11-27 RX ORDER — ATENOLOL AND CHLORTHALIDONE TABLET 100; 25 MG/1; MG/1
1 TABLET ORAL EVERY MORNING
Qty: 90 TABLET | Refills: 0 | OUTPATIENT
Start: 2023-11-27

## 2023-11-27 RX ORDER — LISINOPRIL 40 MG/1
40 TABLET ORAL NIGHTLY
Qty: 90 TABLET | Refills: 0 | OUTPATIENT
Start: 2023-11-27

## 2023-11-28 DIAGNOSIS — I10 ESSENTIAL HYPERTENSION: ICD-10-CM

## 2023-11-28 RX ORDER — LISINOPRIL 20 MG/1
20 TABLET ORAL DAILY
Qty: 30 TABLET | Refills: 0 | OUTPATIENT
Start: 2023-11-28

## 2023-12-04 DIAGNOSIS — I10 ESSENTIAL HYPERTENSION: ICD-10-CM

## 2023-12-04 RX ORDER — LISINOPRIL 30 MG/1
30 TABLET ORAL DAILY
Qty: 90 TABLET | Refills: 1 | OUTPATIENT
Start: 2023-12-04

## 2023-12-04 RX ORDER — ATENOLOL AND CHLORTHALIDONE TABLET 100; 25 MG/1; MG/1
1 TABLET ORAL EVERY MORNING
Qty: 90 TABLET | Refills: 3 | Status: SHIPPED | OUTPATIENT
Start: 2023-12-04

## 2023-12-04 NOTE — TELEPHONE ENCOUNTER
Lisinopril and Tenoretic  LAST REFILL 8-28-23  AMOUNT 90     0 REFILLS  LAST VISIT 9-25-23  NEXT VISIT 12-14-23

## 2023-12-14 ENCOUNTER — OFFICE VISIT (OUTPATIENT)
Dept: INTERNAL MEDICINE CLINIC | Age: 73
End: 2023-12-14

## 2023-12-14 VITALS
DIASTOLIC BLOOD PRESSURE: 80 MMHG | HEIGHT: 64 IN | HEART RATE: 59 BPM | WEIGHT: 166 LBS | SYSTOLIC BLOOD PRESSURE: 145 MMHG | OXYGEN SATURATION: 93 % | BODY MASS INDEX: 28.34 KG/M2

## 2023-12-14 DIAGNOSIS — I10 ESSENTIAL HYPERTENSION: Primary | ICD-10-CM

## 2023-12-14 NOTE — PROGRESS NOTES
aMgdiel Carter  YOB: 1950    Date of Service:  12/14/2023    Chief Complaint:      Chief Complaint   Patient presents with    Hypertension       Assessment/Plan:  Ace Drafts was seen today for hypertension. Diagnoses and all orders for this visit:    Essential hypertension    Increase Lisinopril 40 mg 1/2 daily alternate with 1 daily    Return Fasting AWV and f/u 7/25. HPI:  Magdiel Carter is a 68 y.o. Treatment Adherence:  Medication compliance:  compliant most of the time  Diet compliance:  compliant most of the time  Weight trend: stable  Current exercise: aerobics 4 time(s) per week  Barriers: none     Hypertension:  Home blood pressure monitoring: yes 105-120/60-70 on Lisinopril 40 mg 1/2 PM and Atenolol/hctz 100/25 mg AM.  She is adherent to a low sodium diet. Patient denies chest pain, shortness of breath, headache, lightheadedness, blurred vision, peripheral edema, palpitations, dry cough and fatigue. Antihypertensive medication side effects: no medication side effects noted. Use of agents associated with hypertension: none. Hyperlipidemia:  No new myalgias or GI upset on aorvastatin (Lipitor) 20 mg qd.   Chronic viral Hep B:  stable per GI    Lab Results   Component Value Date    LABA1C 6.0 07/21/2022    LABA1C 5.9 07/08/2020    LABA1C 6.0 07/02/2019     Lab Results   Component Value Date     09/25/2023    K 4.4 09/25/2023    CL 99 09/25/2023    CO2 29 09/25/2023    BUN 20 09/25/2023    CREATININE 0.9 09/25/2023    GLUCOSE 107 (H) 09/25/2023    CALCIUM 9.5 09/25/2023     Lab Results   Component Value Date/Time    CHOL 148 07/24/2023 10:06 AM    TRIG 178 07/24/2023 10:06 AM    HDL 38 07/24/2023 10:06 AM    LDLCALC 74 07/24/2023 10:06 AM     Lab Results   Component Value Date    ALT 27 07/24/2023    AST 25 07/24/2023     Lab Results   Component Value Date    TSH 1.46 01/08/2013     Lab Results   Component Value Date    WBC 9.3 07/24/2023    HGB 14.0 07/24/2023    HCT 42.6

## 2024-02-05 DIAGNOSIS — I10 ESSENTIAL HYPERTENSION: ICD-10-CM

## 2024-02-05 RX ORDER — LISINOPRIL 40 MG/1
40 TABLET ORAL NIGHTLY
Qty: 90 TABLET | Refills: 1 | Status: SHIPPED | OUTPATIENT
Start: 2024-02-05

## 2024-02-05 NOTE — TELEPHONE ENCOUNTER
LAST REFILL 8-  (alternating 1 and 1 1/2?)  AMOUNT 90     0 REFILLS  LAST VISIT 12-14-23  NEXT VISIT 7-

## 2024-07-30 ENCOUNTER — OFFICE VISIT (OUTPATIENT)
Dept: INTERNAL MEDICINE CLINIC | Age: 74
End: 2024-07-30

## 2024-07-30 ENCOUNTER — HOSPITAL ENCOUNTER (OUTPATIENT)
Dept: WOMENS IMAGING | Age: 74
Discharge: HOME | End: 2024-07-30
Payer: MEDICARE

## 2024-07-30 VITALS
WEIGHT: 164 LBS | HEART RATE: 53 BPM | BODY MASS INDEX: 28 KG/M2 | DIASTOLIC BLOOD PRESSURE: 52 MMHG | OXYGEN SATURATION: 97 % | SYSTOLIC BLOOD PRESSURE: 92 MMHG | HEIGHT: 64 IN

## 2024-07-30 VITALS — WEIGHT: 164 LBS | BODY MASS INDEX: 28 KG/M2 | HEIGHT: 64 IN

## 2024-07-30 DIAGNOSIS — Z00.00 MEDICARE ANNUAL WELLNESS VISIT, SUBSEQUENT: Primary | ICD-10-CM

## 2024-07-30 DIAGNOSIS — I10 ESSENTIAL HYPERTENSION: ICD-10-CM

## 2024-07-30 DIAGNOSIS — E78.2 MIXED HYPERLIPIDEMIA: ICD-10-CM

## 2024-07-30 DIAGNOSIS — B18.1 CHRONIC VIRAL HEPATITIS B WITHOUT DELTA AGENT AND WITHOUT COMA (HCC): ICD-10-CM

## 2024-07-30 DIAGNOSIS — Z12.31 VISIT FOR SCREENING MAMMOGRAM: ICD-10-CM

## 2024-07-30 PROCEDURE — 77063 BREAST TOMOSYNTHESIS BI: CPT

## 2024-07-30 RX ORDER — ATORVASTATIN CALCIUM 20 MG/1
20 TABLET, FILM COATED ORAL DAILY
Qty: 90 TABLET | Refills: 3 | Status: SHIPPED | OUTPATIENT
Start: 2024-07-30

## 2024-07-30 RX ORDER — LISINOPRIL 20 MG/1
20 TABLET ORAL DAILY
Qty: 90 TABLET | Refills: 1 | Status: SHIPPED | OUTPATIENT
Start: 2024-07-30

## 2024-07-30 SDOH — ECONOMIC STABILITY: FOOD INSECURITY: WITHIN THE PAST 12 MONTHS, YOU WORRIED THAT YOUR FOOD WOULD RUN OUT BEFORE YOU GOT MONEY TO BUY MORE.: NEVER TRUE

## 2024-07-30 SDOH — ECONOMIC STABILITY: INCOME INSECURITY: HOW HARD IS IT FOR YOU TO PAY FOR THE VERY BASICS LIKE FOOD, HOUSING, MEDICAL CARE, AND HEATING?: NOT HARD AT ALL

## 2024-07-30 SDOH — ECONOMIC STABILITY: FOOD INSECURITY: WITHIN THE PAST 12 MONTHS, THE FOOD YOU BOUGHT JUST DIDN'T LAST AND YOU DIDN'T HAVE MONEY TO GET MORE.: NEVER TRUE

## 2024-07-30 ASSESSMENT — PATIENT HEALTH QUESTIONNAIRE - PHQ9
SUM OF ALL RESPONSES TO PHQ QUESTIONS 1-9: 6
4. FEELING TIRED OR HAVING LITTLE ENERGY: NOT AT ALL
9. THOUGHTS THAT YOU WOULD BE BETTER OFF DEAD, OR OF HURTING YOURSELF: NOT AT ALL
1. LITTLE INTEREST OR PLEASURE IN DOING THINGS: MORE THAN HALF THE DAYS
8. MOVING OR SPEAKING SO SLOWLY THAT OTHER PEOPLE COULD HAVE NOTICED. OR THE OPPOSITE, BEING SO FIGETY OR RESTLESS THAT YOU HAVE BEEN MOVING AROUND A LOT MORE THAN USUAL: NOT AT ALL
7. TROUBLE CONCENTRATING ON THINGS, SUCH AS READING THE NEWSPAPER OR WATCHING TELEVISION: NOT AT ALL
SUM OF ALL RESPONSES TO PHQ9 QUESTIONS 1 & 2: 4
SUM OF ALL RESPONSES TO PHQ QUESTIONS 1-9: 6
10. IF YOU CHECKED OFF ANY PROBLEMS, HOW DIFFICULT HAVE THESE PROBLEMS MADE IT FOR YOU TO DO YOUR WORK, TAKE CARE OF THINGS AT HOME, OR GET ALONG WITH OTHER PEOPLE: NOT DIFFICULT AT ALL
SUM OF ALL RESPONSES TO PHQ QUESTIONS 1-9: 6
2. FEELING DOWN, DEPRESSED OR HOPELESS: MORE THAN HALF THE DAYS
3. TROUBLE FALLING OR STAYING ASLEEP: MORE THAN HALF THE DAYS
SUM OF ALL RESPONSES TO PHQ QUESTIONS 1-9: 6
5. POOR APPETITE OR OVEREATING: NOT AT ALL
6. FEELING BAD ABOUT YOURSELF - OR THAT YOU ARE A FAILURE OR HAVE LET YOURSELF OR YOUR FAMILY DOWN: NOT AT ALL

## 2024-07-30 ASSESSMENT — LIFESTYLE VARIABLES
HOW MANY STANDARD DRINKS CONTAINING ALCOHOL DO YOU HAVE ON A TYPICAL DAY: 5 OR 6
HOW OFTEN DURING THE LAST YEAR HAVE YOU FAILED TO DO WHAT WAS NORMALLY EXPECTED FROM YOU BECAUSE OF DRINKING: NEVER
HAVE YOU OR SOMEONE ELSE BEEN INJURED AS A RESULT OF YOUR DRINKING: NO
HOW OFTEN DURING THE LAST YEAR HAVE YOU FOUND THAT YOU WERE NOT ABLE TO STOP DRINKING ONCE YOU HAD STARTED: NEVER
HOW OFTEN DURING THE LAST YEAR HAVE YOU HAD A FEELING OF GUILT OR REMORSE AFTER DRINKING: NEVER
HAS A RELATIVE, FRIEND, DOCTOR, OR ANOTHER HEALTH PROFESSIONAL EXPRESSED CONCERN ABOUT YOUR DRINKING OR SUGGESTED YOU CUT DOWN: NO
HOW OFTEN DURING THE LAST YEAR HAVE YOU BEEN UNABLE TO REMEMBER WHAT HAPPENED THE NIGHT BEFORE BECAUSE YOU HAD BEEN DRINKING: NEVER
HOW OFTEN DURING THE LAST YEAR HAVE YOU NEEDED AN ALCOHOLIC DRINK FIRST THING IN THE MORNING TO GET YOURSELF GOING AFTER A NIGHT OF HEAVY DRINKING: NEVER
HOW OFTEN DO YOU HAVE A DRINK CONTAINING ALCOHOL: 2-3 TIMES A WEEK

## 2024-07-30 NOTE — PROGRESS NOTES
Medicare Annual Wellness Visit    Kim Velarde is here for Medicare AWV and Hypertension    Assessment & Plan   Medicare annual wellness visit, subsequent  Recommendations for Preventive Services Due: see orders and patient instructions/AVS.  Recommended screening schedule for the next 5-10 years is provided to the patient in written form: see Patient Instructions/AVS.     No follow-ups on file.     Subjective       Patient's complete Health Risk Assessment and screening values have been reviewed and are found in Flowsheets. The following problems were reviewed today and where indicated follow up appointments were made and/or referrals ordered.    Positive Risk Factor Screenings with Interventions:        Depression:  PHQ-2 Score: 4  PHQ-9 Total Score: 6  Total Score Interpretation: 5-9 = mild depression  Interventions:  Patient comments: only in July due to anniversary of her daughter's death    Alcohol Screening:  AUDIT-C Score: 5  AUDIT Total Score: 5  Total Score Interpretation: 0-7 suggests low risk alcohol consumption but assess individual risks   Interventions:  Patient comments: drink 5 beers 2 times a week and will try to cut back              Inactivity:  On average, how many days per week do you engage in moderate to strenuous exercise (like a brisk walk)?: 1 day (cutting grass, doing yard work) (!) Abnormal  On average, how many minutes do you engage in exercise at this level?: 120 min  Interventions:  Patient declined any further interventions or treatment        Vision Screen:  Do you have difficulty driving, watching TV, or doing any of your daily activities because of your eyesight?: No  Have you had an eye exam within the past year?: (!) No  Interventions:   Patient encouraged to make appointment with their eye specialist    Safety:  Do you have non-slip mats or non-slip surfaces or shower bars or grab bars in your shower or bathtub?: (!) No (needs to install)  Interventions:  See above    
    Body mass index is 28.6 kg/m².  Constitutional: Patient appears well-developed and well-nourished. No distress.   Head: Normocephalic and atraumatic.   Neck: Normal range of motion. Neck supple. No thyroidmegaly.   Cardiovascular: Normal rate, regular rhythm, normal heart sounds and intact distal pulses.   Pulmonary/Chest: Effort normal and breath sounds normal. No stridor. No respiratory distress. No wheezes and no rales.   Abdominal: Soft. Bowel sounds are normal. No distension and no mass. No tenderness. No rebound and no guarding.   Musculoskeletal: No edema and no tenderness.   Skin: No rash or erythema.

## 2024-07-31 LAB
ALBUMIN SERPL-MCNC: 4.4 G/DL (ref 3.4–5)
ALBUMIN/GLOB SERPL: 1.5 {RATIO} (ref 1.1–2.2)
ALP SERPL-CCNC: 85 U/L (ref 40–129)
ALT SERPL-CCNC: 25 U/L (ref 10–40)
ANION GAP SERPL CALCULATED.3IONS-SCNC: 11 MMOL/L (ref 3–16)
AST SERPL-CCNC: 24 U/L (ref 15–37)
BASOPHILS # BLD: 0.1 K/UL (ref 0–0.2)
BASOPHILS NFR BLD: 0.9 %
BILIRUB SERPL-MCNC: 0.5 MG/DL (ref 0–1)
BUN SERPL-MCNC: 25 MG/DL (ref 7–20)
CALCIUM SERPL-MCNC: 10.3 MG/DL (ref 8.3–10.6)
CHLORIDE SERPL-SCNC: 94 MMOL/L (ref 99–110)
CHOLEST SERPL-MCNC: 152 MG/DL (ref 0–199)
CO2 SERPL-SCNC: 31 MMOL/L (ref 21–32)
CREAT SERPL-MCNC: 0.8 MG/DL (ref 0.6–1.2)
DEPRECATED RDW RBC AUTO: 13.2 % (ref 12.4–15.4)
EOSINOPHIL # BLD: 0.2 K/UL (ref 0–0.6)
EOSINOPHIL NFR BLD: 1.9 %
GFR SERPLBLD CREATININE-BSD FMLA CKD-EPI: 77 ML/MIN/{1.73_M2}
GLUCOSE SERPL-MCNC: 121 MG/DL (ref 70–99)
HCT VFR BLD AUTO: 40.2 % (ref 36–48)
HDLC SERPL-MCNC: 43 MG/DL (ref 40–60)
HGB BLD-MCNC: 13.6 G/DL (ref 12–16)
LDLC SERPL CALC-MCNC: 78 MG/DL
LYMPHOCYTES # BLD: 2.6 K/UL (ref 1–5.1)
LYMPHOCYTES NFR BLD: 28.4 %
MCH RBC QN AUTO: 29.9 PG (ref 26–34)
MCHC RBC AUTO-ENTMCNC: 34 G/DL (ref 31–36)
MCV RBC AUTO: 88.2 FL (ref 80–100)
MONOCYTES # BLD: 0.9 K/UL (ref 0–1.3)
MONOCYTES NFR BLD: 10.4 %
NEUTROPHILS # BLD: 5.3 K/UL (ref 1.7–7.7)
NEUTROPHILS NFR BLD: 58.4 %
PLATELET # BLD AUTO: 296 K/UL (ref 135–450)
PMV BLD AUTO: 10.3 FL (ref 5–10.5)
POTASSIUM SERPL-SCNC: 4.2 MMOL/L (ref 3.5–5.1)
PROT SERPL-MCNC: 7.4 G/DL (ref 6.4–8.2)
RBC # BLD AUTO: 4.55 M/UL (ref 4–5.2)
SODIUM SERPL-SCNC: 136 MMOL/L (ref 136–145)
TRIGL SERPL-MCNC: 157 MG/DL (ref 0–150)
VLDLC SERPL CALC-MCNC: 31 MG/DL
WBC # BLD AUTO: 9.1 K/UL (ref 4–11)

## 2024-11-24 DIAGNOSIS — I10 ESSENTIAL HYPERTENSION: ICD-10-CM

## 2024-11-26 RX ORDER — ATENOLOL AND CHLORTHALIDONE TABLET 100; 25 MG/1; MG/1
1 TABLET ORAL EVERY MORNING
Qty: 90 TABLET | Refills: 2 | Status: SHIPPED | OUTPATIENT
Start: 2024-11-26

## 2025-01-20 ENCOUNTER — OFFICE VISIT (OUTPATIENT)
Dept: INTERNAL MEDICINE CLINIC | Age: 75
End: 2025-01-20
Payer: MEDICARE

## 2025-01-20 VITALS
WEIGHT: 163 LBS | OXYGEN SATURATION: 97 % | DIASTOLIC BLOOD PRESSURE: 52 MMHG | HEIGHT: 64 IN | HEART RATE: 61 BPM | SYSTOLIC BLOOD PRESSURE: 92 MMHG | BODY MASS INDEX: 27.83 KG/M2

## 2025-01-20 DIAGNOSIS — I10 ESSENTIAL HYPERTENSION: Primary | ICD-10-CM

## 2025-01-20 DIAGNOSIS — B18.1 CHRONIC VIRAL HEPATITIS B WITHOUT DELTA AGENT AND WITHOUT COMA (HCC): ICD-10-CM

## 2025-01-20 DIAGNOSIS — E78.2 MIXED HYPERLIPIDEMIA: ICD-10-CM

## 2025-01-20 PROCEDURE — 99213 OFFICE O/P EST LOW 20 MIN: CPT | Performed by: INTERNAL MEDICINE

## 2025-01-20 PROCEDURE — G8399 PT W/DXA RESULTS DOCUMENT: HCPCS | Performed by: INTERNAL MEDICINE

## 2025-01-20 PROCEDURE — 1159F MED LIST DOCD IN RCRD: CPT | Performed by: INTERNAL MEDICINE

## 2025-01-20 PROCEDURE — G8427 DOCREV CUR MEDS BY ELIG CLIN: HCPCS | Performed by: INTERNAL MEDICINE

## 2025-01-20 PROCEDURE — 1090F PRES/ABSN URINE INCON ASSESS: CPT | Performed by: INTERNAL MEDICINE

## 2025-01-20 PROCEDURE — G8417 CALC BMI ABV UP PARAM F/U: HCPCS | Performed by: INTERNAL MEDICINE

## 2025-01-20 PROCEDURE — 4004F PT TOBACCO SCREEN RCVD TLK: CPT | Performed by: INTERNAL MEDICINE

## 2025-01-20 PROCEDURE — 1123F ACP DISCUSS/DSCN MKR DOCD: CPT | Performed by: INTERNAL MEDICINE

## 2025-01-20 PROCEDURE — 3074F SYST BP LT 130 MM HG: CPT | Performed by: INTERNAL MEDICINE

## 2025-01-20 PROCEDURE — 1160F RVW MEDS BY RX/DR IN RCRD: CPT | Performed by: INTERNAL MEDICINE

## 2025-01-20 PROCEDURE — 3078F DIAST BP <80 MM HG: CPT | Performed by: INTERNAL MEDICINE

## 2025-01-20 PROCEDURE — 3017F COLORECTAL CA SCREEN DOC REV: CPT | Performed by: INTERNAL MEDICINE

## 2025-01-20 RX ORDER — LISINOPRIL 20 MG/1
20 TABLET ORAL DAILY
Qty: 90 TABLET | Refills: 1 | Status: SHIPPED | OUTPATIENT
Start: 2025-01-20

## 2025-01-20 SDOH — ECONOMIC STABILITY: FOOD INSECURITY: WITHIN THE PAST 12 MONTHS, YOU WORRIED THAT YOUR FOOD WOULD RUN OUT BEFORE YOU GOT MONEY TO BUY MORE.: NEVER TRUE

## 2025-01-20 SDOH — ECONOMIC STABILITY: FOOD INSECURITY: WITHIN THE PAST 12 MONTHS, THE FOOD YOU BOUGHT JUST DIDN'T LAST AND YOU DIDN'T HAVE MONEY TO GET MORE.: NEVER TRUE

## 2025-01-20 ASSESSMENT — PATIENT HEALTH QUESTIONNAIRE - PHQ9
SUM OF ALL RESPONSES TO PHQ QUESTIONS 1-9: 0
1. LITTLE INTEREST OR PLEASURE IN DOING THINGS: NOT AT ALL
SUM OF ALL RESPONSES TO PHQ QUESTIONS 1-9: 0
SUM OF ALL RESPONSES TO PHQ9 QUESTIONS 1 & 2: 0
SUM OF ALL RESPONSES TO PHQ QUESTIONS 1-9: 0
2. FEELING DOWN, DEPRESSED OR HOPELESS: NOT AT ALL
SUM OF ALL RESPONSES TO PHQ QUESTIONS 1-9: 0

## 2025-01-20 NOTE — PROGRESS NOTES
Kim Velarde  YOB: 1950    Date of Service:  1/20/2025    Chief Complaint:      Chief Complaint   Patient presents with    Hyperlipidemia     Not fasting    Hypertension       Assessment/Plan:  Kim was seen today for hyperlipidemia and hypertension.    Diagnoses and all orders for this visit:    Essential hypertension  -     lisinopril (PRINIVIL;ZESTRIL) 20 MG tablet; Take 1 tablet by mouth daily    Mixed hyperlipidemia    Chronic viral hepatitis B without delta agent and without coma (HCC)    Stable and continue on current medications.    Return Fasting AWV and f/u 8/4.      HPI:  Kim Velarde is a 74 y.o.    Treatment Adherence:  Medication compliance:  compliant most of the time  Diet compliance:  compliant most of the time  Weight trend: stable  Current exercise: aerobics 4 time(s) per week  Barriers: none     Hypertension:  Home blood pressure monitoring: yes 120/60-70 on Lisinopril 40 mg PM and Atenolol/hctz 100/25 mg AM.  She is adherent to a low sodium diet. Patient denies chest pain, shortness of breath, headache, lightheadedness, blurred vision, peripheral edema, palpitations, dry cough and fatigue.  Antihypertensive medication side effects: no medication side effects noted.  Use of agents associated with hypertension: none.  Hyperlipidemia:  No new myalgias or GI upset on aorvastatin (Lipitor) 20 mg qd.  Chronic viral Hep B:  stable per GI    Lab Results   Component Value Date    LABA1C 6.0 07/21/2022    LABA1C 5.9 07/08/2020    LABA1C 6.0 07/02/2019     Lab Results   Component Value Date     07/30/2024    K 4.2 07/30/2024    CL 94 (L) 07/30/2024    CO2 31 07/30/2024    BUN 25 (H) 07/30/2024    CREATININE 0.8 07/30/2024    GLUCOSE 121 (H) 07/30/2024    CALCIUM 10.3 07/30/2024     Lab Results   Component Value Date/Time    CHOL 152 07/30/2024 10:26 AM    TRIG 157 07/30/2024 10:26 AM    HDL 43 07/30/2024 10:26 AM     Lab Results   Component Value Date    ALT 25 07/30/2024    AST

## 2025-08-12 DIAGNOSIS — B18.1 CHRONIC HEPATITIS B (HCC): Primary | ICD-10-CM

## (undated) DEVICE — ENDO CARRY-ON PROCEDURE KIT INCLUDES SUCTION TUBING, LUBRICANT, GAUZE, BIOHAZARD STICKER, TRANSPORT PAD AND INTERCEPT BEDSIDE KIT.: Brand: ENDO CARRY-ON PROCEDURE KIT